# Patient Record
Sex: FEMALE | Race: OTHER | HISPANIC OR LATINO | Employment: UNEMPLOYED | URBAN - METROPOLITAN AREA
[De-identification: names, ages, dates, MRNs, and addresses within clinical notes are randomized per-mention and may not be internally consistent; named-entity substitution may affect disease eponyms.]

---

## 2020-04-22 ENCOUNTER — HOSPITAL ENCOUNTER (EMERGENCY)
Facility: HOSPITAL | Age: 20
Discharge: HOME | End: 2020-04-23
Attending: EMERGENCY MEDICINE
Payer: MEDICAID

## 2020-04-22 DIAGNOSIS — E87.6 HYPOKALEMIA: ICD-10-CM

## 2020-04-22 DIAGNOSIS — Z29.13 NEED FOR RHOGAM DUE TO RH NEGATIVE MOTHER: ICD-10-CM

## 2020-04-22 DIAGNOSIS — O20.0 THREATENED MISCARRIAGE IN EARLY PREGNANCY: Primary | ICD-10-CM

## 2020-04-22 LAB
ABO + RH BLD: NORMAL
ALBUMIN SERPL BCP-MCNC: 4.2 G/DL (ref 3.5–5.2)
ALP SERPL-CCNC: 64 U/L (ref 55–135)
ALT SERPL W/O P-5'-P-CCNC: 18 U/L (ref 10–44)
ANION GAP SERPL CALC-SCNC: 10 MMOL/L (ref 8–16)
AST SERPL-CCNC: 21 U/L (ref 10–40)
B-HCG UR QL: POSITIVE
BACTERIA #/AREA URNS HPF: ABNORMAL /HPF
BASOPHILS # BLD AUTO: 0.03 K/UL (ref 0–0.2)
BASOPHILS NFR BLD: 0.2 % (ref 0–1.9)
BILIRUB SERPL-MCNC: 0.2 MG/DL (ref 0.1–1)
BILIRUB UR QL STRIP: NEGATIVE
BUN SERPL-MCNC: 9 MG/DL (ref 6–20)
CALCIUM SERPL-MCNC: 10 MG/DL (ref 8.7–10.5)
CHLORIDE SERPL-SCNC: 102 MMOL/L (ref 95–110)
CLARITY UR: CLEAR
CO2 SERPL-SCNC: 27 MMOL/L (ref 23–29)
COLOR UR: YELLOW
CREAT SERPL-MCNC: 0.7 MG/DL (ref 0.5–1.4)
CTP QC/QA: YES
DIFFERENTIAL METHOD: ABNORMAL
EOSINOPHIL # BLD AUTO: 0.1 K/UL (ref 0–0.5)
EOSINOPHIL NFR BLD: 0.9 % (ref 0–8)
ERYTHROCYTE [DISTWIDTH] IN BLOOD BY AUTOMATED COUNT: 12.6 % (ref 11.5–14.5)
EST. GFR  (AFRICAN AMERICAN): >60 ML/MIN/1.73 M^2
EST. GFR  (NON AFRICAN AMERICAN): >60 ML/MIN/1.73 M^2
GLUCOSE SERPL-MCNC: 108 MG/DL (ref 70–110)
GLUCOSE UR QL STRIP: NEGATIVE
HCG INTACT+B SERPL-ACNC: 1123 MIU/ML
HCT VFR BLD AUTO: 38.2 % (ref 37–48.5)
HGB BLD-MCNC: 12.8 G/DL (ref 12–16)
HGB UR QL STRIP: ABNORMAL
IMM GRANULOCYTES # BLD AUTO: 0.05 K/UL (ref 0–0.04)
IMM GRANULOCYTES NFR BLD AUTO: 0.3 % (ref 0–0.5)
KETONES UR QL STRIP: NEGATIVE
LEUKOCYTE ESTERASE UR QL STRIP: NEGATIVE
LYMPHOCYTES # BLD AUTO: 2.7 K/UL (ref 1–4.8)
LYMPHOCYTES NFR BLD: 17.5 % (ref 18–48)
MCH RBC QN AUTO: 29.4 PG (ref 27–31)
MCHC RBC AUTO-ENTMCNC: 33.5 G/DL (ref 32–36)
MCV RBC AUTO: 88 FL (ref 82–98)
MICROSCOPIC COMMENT: ABNORMAL
MONOCYTES # BLD AUTO: 0.8 K/UL (ref 0.3–1)
MONOCYTES NFR BLD: 5.1 % (ref 4–15)
NEUTROPHILS # BLD AUTO: 11.6 K/UL (ref 1.8–7.7)
NEUTROPHILS NFR BLD: 76 % (ref 38–73)
NITRITE UR QL STRIP: NEGATIVE
NRBC BLD-RTO: 0 /100 WBC
PH UR STRIP: 8 [PH] (ref 5–8)
PLATELET # BLD AUTO: 305 K/UL (ref 150–350)
PMV BLD AUTO: 9.6 FL (ref 9.2–12.9)
POTASSIUM SERPL-SCNC: 2.9 MMOL/L (ref 3.5–5.1)
PROT SERPL-MCNC: 8.5 G/DL (ref 6–8.4)
PROT UR QL STRIP: NEGATIVE
RBC # BLD AUTO: 4.36 M/UL (ref 4–5.4)
RBC #/AREA URNS HPF: >100 /HPF (ref 0–4)
SODIUM SERPL-SCNC: 139 MMOL/L (ref 136–145)
SP GR UR STRIP: 1.01 (ref 1–1.03)
URN SPEC COLLECT METH UR: ABNORMAL
UROBILINOGEN UR STRIP-ACNC: NEGATIVE EU/DL
WBC # BLD AUTO: 15.29 K/UL (ref 3.9–12.7)

## 2020-04-22 PROCEDURE — 63700000 PHARM REV CODE 250 ALT 637 W/O HCPCS: Performed by: NURSE PRACTITIONER

## 2020-04-22 PROCEDURE — 85025 COMPLETE CBC W/AUTO DIFF WBC: CPT

## 2020-04-22 PROCEDURE — 96372 THER/PROPH/DIAG INJ SC/IM: CPT

## 2020-04-22 PROCEDURE — 80053 COMPREHEN METABOLIC PANEL: CPT

## 2020-04-22 PROCEDURE — 99284 EMERGENCY DEPT VISIT MOD MDM: CPT | Mod: 25

## 2020-04-22 PROCEDURE — 84702 CHORIONIC GONADOTROPIN TEST: CPT

## 2020-04-22 PROCEDURE — 86901 BLOOD TYPING SEROLOGIC RH(D): CPT

## 2020-04-22 PROCEDURE — 86850 RBC ANTIBODY SCREEN: CPT

## 2020-04-22 PROCEDURE — 81025 URINE PREGNANCY TEST: CPT | Performed by: NURSE PRACTITIONER

## 2020-04-22 PROCEDURE — 81000 URINALYSIS NONAUTO W/SCOPE: CPT

## 2020-04-22 RX ORDER — POTASSIUM CHLORIDE 20 MEQ/15ML
40 SOLUTION ORAL
Status: COMPLETED | OUTPATIENT
Start: 2020-04-22 | End: 2020-04-22

## 2020-04-22 RX ADMIN — POTASSIUM CHLORIDE 40 MEQ: 1.5 SOLUTION ORAL at 08:04

## 2020-04-23 VITALS
HEART RATE: 87 BPM | DIASTOLIC BLOOD PRESSURE: 77 MMHG | HEIGHT: 64 IN | TEMPERATURE: 99 F | RESPIRATION RATE: 18 BRPM | OXYGEN SATURATION: 99 % | SYSTOLIC BLOOD PRESSURE: 132 MMHG

## 2020-04-23 LAB
BLD GP AB SCN CELLS X3 SERPL QL: NORMAL
INJECT RH IG VOL PATIENT: NORMAL ML

## 2020-04-23 PROCEDURE — 63600519 RHOGAM PHARM REV CODE 636 ALT 250 W HCPCS: Performed by: NURSE PRACTITIONER

## 2020-04-23 RX ADMIN — HUMAN RHO(D) IMMUNE GLOBULIN 300 MCG: 300 INJECTION, SOLUTION INTRAMUSCULAR at 12:04

## 2020-04-23 NOTE — DISCHARGE INSTRUCTIONS
MUCHO aqua.     ¡[place] pélvico (nada en la vagina) hasta que veas a tu médico!    Regrese si está usando más de letty almohadilla por hora, si tiene empeoramiento o cambio de dolor, o si tiene alguna inquietud.  Usted necesitará tener laboratorios repetidos y ultrasonidos organizados por johnson médico dentro de los próximos 4-5 días..

## 2020-04-23 NOTE — ED PROVIDER NOTES
Encounter Date: 2020    SCRIBE #1 NOTE: I, Triston Burton, am scribing for, and in the presence of, RICHMOND Craig.       History     Chief Complaint   Patient presents with    Vaginal Bleeding     pt reports that she is 2 months pregnant, reports heavy vaginal bleeding that started today;     Abdominal Pain     lower abd pain and cramping with lower back pain     20 year-old  female presents to the emergency department with vaginal bleeding that began today. Patient reports she is currently 2 months pregnant. States he has used more than 5 pads today due to heavy vaginal bleeding and has passed clots. She denies any current abdominal pain. Her LMP was . She reports her menstrual cycles are relatively regular. She has not had an U/S for this pregnancy. Her first OB/GYN appointment for this pregnancy is scheduled for  with Dr. Palmer.  No trauma.  No other complaints at this time.      The history is provided by the patient. The history is limited by a language barrier. A  was used (JANETT Lew).     Review of patient's allergies indicates:  No Known Allergies  No past medical history on file.  No past surgical history on file.  No family history on file.  Social History     Tobacco Use    Smoking status: Not on file   Substance Use Topics    Alcohol use: Not on file    Drug use: Not on file     Review of Systems   Constitutional: Negative for fever.   HENT: Negative for sore throat.    Respiratory: Negative for shortness of breath.    Cardiovascular: Negative for chest pain.   Gastrointestinal: Negative for abdominal pain and vomiting.   Genitourinary: Positive for vaginal bleeding. Negative for dysuria.   Musculoskeletal: Negative for back pain.   Skin: Negative for rash.   Neurological: Negative for weakness.   Hematological: Does not bruise/bleed easily.   All other systems reviewed and are negative.      Physical Exam     Initial Vitals [20 1909]   BP  Pulse Resp Temp SpO2   137/76 107 20 98.9 °F (37.2 °C) 100 %      MAP       --         Physical Exam    Nursing note and vitals reviewed.  Constitutional: Vital signs are normal. She appears well-developed and well-nourished. She is active and cooperative. She is easily aroused.  Non-toxic appearance. She does not have a sickly appearance. She does not appear ill. No distress.   HENT:   Head: Normocephalic and atraumatic.   Eyes: EOM are normal. Pupils are equal, round, and reactive to light.   Neck: Normal range of motion. Neck supple.   Cardiovascular: Normal rate, regular rhythm, normal heart sounds and intact distal pulses.   Pulmonary/Chest: Effort normal and breath sounds normal. No respiratory distress. She has no wheezes.   Abdominal: Soft. Normal appearance and bowel sounds are normal. She exhibits no distension. There is no tenderness. There is no rigidity, no rebound and no guarding.   Genitourinary: Pelvic exam was performed with patient supine. No labial fusion. There is no rash, tenderness, lesion or injury on the right labia. There is no rash, tenderness, lesion or injury on the left labia.   Genitourinary Comments:  exam witnessed by Nurse gosia Izaguirre.    No adnexal tenderness or fullness.     Os open.   Moderate amount dark red blood with clots.  No visualized tissue.     Musculoskeletal: Normal range of motion. She exhibits no edema.   Neurological: She is alert, oriented to person, place, and time and easily aroused.   Skin: Skin is warm and dry.         ED Course   Procedures  Labs Reviewed   CBC W/ AUTO DIFFERENTIAL - Abnormal; Notable for the following components:       Result Value    WBC 15.29 (*)     Gran # (ANC) 11.6 (*)     Immature Grans (Abs) 0.05 (*)     Gran% 76.0 (*)     Lymph% 17.5 (*)     All other components within normal limits   COMPREHENSIVE METABOLIC PANEL - Abnormal; Notable for the following components:    Potassium 2.9 (*)     Total Protein 8.5 (*)     All other components  within normal limits   URINALYSIS, REFLEX TO URINE CULTURE - Abnormal; Notable for the following components:    Occult Blood UA 3+ (*)     All other components within normal limits    Narrative:     Preferred Collection Type->Urine, Clean Catch   URINALYSIS MICROSCOPIC - Abnormal; Notable for the following components:    RBC, UA >100 (*)     All other components within normal limits    Narrative:     Preferred Collection Type->Urine, Clean Catch   POCT URINE PREGNANCY - Abnormal; Notable for the following components:    POC Preg Test, Ur Positive (*)     All other components within normal limits   HCG, QUANTITATIVE, PREGNANCY   GROUP & RH   POST PARTUM TYPE AND SCREEN   HALEY - [facility] (FETAL BLEED SCREEN)          Imaging Results          US OB <14 Wks TransAbd & TransVag, Single Gestation (XPD) (Final result)  Result time 20 21:16:30    Final result by Amandeep Velázquez MD (20 21:16:30)                 Impression:      No intrauterine pregnancy or gestational sac visualized, technically pregnancy of unknown location.  Findings may relate to early pregnancy or spontaneous .  No adnexal abnormalities or significant free fluid seen to suggest ectopic pregnancy.  Recommend serial beta hCGs and repeat pelvic ultrasound as clinically warranted.      Electronically signed by: Amandeep Velázquez MD  Date:    2020  Time:    21:16             Narrative:    EXAMINATION:  US OB <14 WEEKS, TRANSABDOM & TRANSVAG, SINGLE GESTATION (XPD)    CLINICAL HISTORY:  vaginal bleeding;    TECHNIQUE:  Transabdominal sonography of the pelvis was performed, followed by transvaginal sonography to better evaluate the uterus and ovaries.    COMPARISON:  None.    FINDINGS:  The uterus is retroverted and measures 8.7 x 5.3 x 6.1 cm.  Uterine parenchyma is heterogenous in echotexture.  No intrauterine pregnancy or gestational sac seen.  There is mild thickened heterogenous appearance of the endometrium.    The right ovary  measures 3.0 x 1.9 x 1.8 cm. The left ovary measures 3.6 x 2.5 x 2.1 cm. Arterial and venous flow are preserved bilaterally. A suspected corpus luteum cyst measuring 1.5 x 1.2 x 1.3 cm is seen in the left ovary. Trace physiologic free fluid is seen.  No adnexal abnormalities are seen.                                 Medical Decision Making:   Differential Diagnosis:   Ectopic pregnancy, placental abruption, placenta previa, ruptured ovarian cyst, ovarian torsion, infection, foreign body, coagulation disorder, neoplasm, menstruation, drug effect.     Clinical Tests:   Lab Tests: Ordered and Reviewed  ED Management:  Labs, US, Pelvic exam.     Pt is O negative and Rhogam was given.  K low and oral replacement was given.  She is tolerating oral fluids.  H&H stable.  Hcg 1123. Vitals reassuring.   US does not visualize a pregnancy.  I fear this patient may have had a miscarriage, but she will need repeat labs and US. I do not suspect ectopic as she has no adnexal tenderness or fullness on exam. Pt does have a f/u appointment with her OB/GYN on 4/27.  I advised her to call her OB/GYN tomorrow to notify them of today's ED visit.  She verbalized understanding, compliance, and agreement with the treatment plan.  I did review need for pelvic rest as well as strict return precautions.    Other:   I have discussed this case with another health care provider.       <> Summary of the Discussion: Discussed with  who agrees with ED course and disposition.                                   Clinical Impression:       ICD-10-CM ICD-9-CM   1. Threatened miscarriage in early pregnancy O20.0 640.03   2. Hypokalemia E87.6 276.8   3. Need for rhogam due to Rh negative mother Z29.13 V07.2           Disposition:   Disposition: Discharged  Condition: Stable         I, Hafsa FRAGOSO, personally performed the services described in this documentation. All medical record entries made by the scribe were at my direction and in  my presence.  I have reviewed the chart and agree that the record reflects my personal performance and is accurate and complete.     LEROY Craig  10:24 PM 04/22/2020                 RICHMOND Pierre  04/22/20 [id number]

## 2020-04-27 ENCOUNTER — LAB VISIT (OUTPATIENT)
Dept: LAB | Facility: HOSPITAL | Age: 20
End: 2020-04-27
Attending: OBSTETRICS & GYNECOLOGY
Payer: MEDICAID

## 2020-04-27 ENCOUNTER — OFFICE VISIT (OUTPATIENT)
Dept: OBSTETRICS AND GYNECOLOGY | Facility: CLINIC | Age: 20
End: 2020-04-27
Payer: MEDICAID

## 2020-04-27 VITALS
BODY MASS INDEX: 29.63 KG/M2 | SYSTOLIC BLOOD PRESSURE: 118 MMHG | DIASTOLIC BLOOD PRESSURE: 70 MMHG | WEIGHT: 172.63 LBS

## 2020-04-27 DIAGNOSIS — O03.9 SAB (SPONTANEOUS ABORTION): ICD-10-CM

## 2020-04-27 DIAGNOSIS — N91.2 AMENORRHEA: Primary | ICD-10-CM

## 2020-04-27 DIAGNOSIS — N91.2 AMENORRHEA: ICD-10-CM

## 2020-04-27 LAB — HCG INTACT+B SERPL-ACNC: 57 MIU/ML

## 2020-04-27 PROCEDURE — 36415 COLL VENOUS BLD VENIPUNCTURE: CPT

## 2020-04-27 PROCEDURE — 99999 PR PBB SHADOW E&M-EST. PATIENT-LVL II: CPT | Mod: PBBFAC,,, | Performed by: OBSTETRICS & GYNECOLOGY

## 2020-04-27 PROCEDURE — 99212 OFFICE O/P EST SF 10 MIN: CPT | Mod: PBBFAC,TH,PO | Performed by: OBSTETRICS & GYNECOLOGY

## 2020-04-27 PROCEDURE — 99203 OFFICE O/P NEW LOW 30 MIN: CPT | Mod: S$PBB,,, | Performed by: OBSTETRICS & GYNECOLOGY

## 2020-04-27 PROCEDURE — 87491 CHLMYD TRACH DNA AMP PROBE: CPT

## 2020-04-27 PROCEDURE — 87086 URINE CULTURE/COLONY COUNT: CPT

## 2020-04-27 PROCEDURE — 84702 CHORIONIC GONADOTROPIN TEST: CPT

## 2020-04-27 PROCEDURE — 99203 PR OFFICE/OUTPT VISIT, NEW, LEVL III, 30-44 MIN: ICD-10-PCS | Mod: S$PBB,,, | Performed by: OBSTETRICS & GYNECOLOGY

## 2020-04-27 PROCEDURE — 88175 CYTOPATH C/V AUTO FLUID REDO: CPT

## 2020-04-27 PROCEDURE — 99999 PR PBB SHADOW E&M-EST. PATIENT-LVL II: ICD-10-PCS | Mod: PBBFAC,,, | Performed by: OBSTETRICS & GYNECOLOGY

## 2020-04-27 NOTE — PROGRESS NOTES
19 yo  female who presents to confirm her pregnancy.  Patient's last menstrual period was 2020.  Patient reports that she presented to the ED for evaluation on 2020 for heavy vaginal bleeding and abdominal pain.  Reports to her visit, she reports having vaginal spotting x 2 days that eventually ended with heavy bleeding like a cycle.  Patient presented to the ED for evaluation on 2020.  Pelvic US was completed for dating. No IUP noted.  Serum bhcg noted to be 1123.  Patient reports that her bleeding stopped soon after leaving the ED.    PMH: none  Surgery: none  Obhx:  g1: , x 1, term  G2: SAB, complete      ALL: NKDA  Social: denies t/d/e  Gynhx: no h/o STDs; never had pap smear    ROS:  GENERAL: Denies weight gain or weight loss. Feeling well overall.   SKIN: Denies rash or lesions.   HEAD: Denies head injury or headache.   NODES: Denies enlarged lymph nodes.   CHEST: Denies chest pain or shortness of breath.   CARDIOVASCULAR: Denies palpitations or left sided chest pain.   ABDOMEN: No abdominal pain, constipation, diarrhea, nausea, vomiting or rectal bleeding.   URINARY: No frequency, dysuria, hematuria, or burning on urination.  REPRODUCTIVE: See HPI. Min bleeding today  BREASTS: denies pain, lumps, or nipple discharge.   HEMATOLOGIC: No easy bruisability or excessive bleeding.   MUSCULOSKELETAL: Denies joint pain or swelling.   NEUROLOGIC: Denies syncope or weakness.   PSYCHIATRIC: Denies depression, anxiety or mood swings.         PE:   Vitals: /70   Wt 78.3 kg (172 lb 9.9 oz)   LMP 2020   BMI 29.63 kg/m²   APPEARANCE: Well nourished, well developed, in no acute distress.  ABDOMEN: Soft. No tenderness or masses. No hepatosplenomegaly. No hernias.  BREASTS: Symmetrical, no skin changes or visible lesions. No palpable masses, nipple discharge or adenopathy bilaterally.  PELVIC: Normal external female genitalia without lesions. Normal hair distribution. Adequate perineal  body, normal urethral meatus. Vagina moist and well rugated without lesions or discharge. Cervix pink and without lesions. No significant cystocele or rectocele. Bimanual exam deferred    AP:   1) Amenorrhea, likely s/p complete SAB in the first trimester  -office UPT faintly positive  -serum bhcg ordered  -pap collected  -gc/chl and urine cx ordered  -Rh negative: s/p rhogam on 4/22/2020 in the ED    2) Contraception  Declines for now    mian Palmer MD

## 2020-04-28 ENCOUNTER — TELEPHONE (OUTPATIENT)
Dept: OBSTETRICS AND GYNECOLOGY | Facility: CLINIC | Age: 20
End: 2020-04-28

## 2020-04-28 NOTE — TELEPHONE ENCOUNTER
----- Message from Marci Costa MA sent at 4/28/2020  8:25 AM CDT -----      ----- Message -----  From: Gamaliel Palmer MD  Sent: 4/28/2020   8:16 AM CDT  To: Spencer DONOVAN Staff    Inform the patient that blood level is 57 - down from 1123 six days ago.  This is consistent with a miscarriage - which we discussed at her visit.  Her home pregnancy test should be negative in the next week.    Please call if she has any concerns.    Dr palmer

## 2020-04-29 LAB
BACTERIA UR CULT: NO GROWTH
FINAL PATHOLOGIC DIAGNOSIS: NORMAL
Lab: NORMAL

## 2020-04-30 LAB
C TRACH DNA SPEC QL NAA+PROBE: DETECTED
N GONORRHOEA DNA SPEC QL NAA+PROBE: NOT DETECTED

## 2020-05-01 ENCOUNTER — TELEPHONE (OUTPATIENT)
Dept: OBSTETRICS AND GYNECOLOGY | Facility: CLINIC | Age: 20
End: 2020-05-01

## 2020-05-01 DIAGNOSIS — N72 CERVICITIS: Primary | ICD-10-CM

## 2020-05-01 RX ORDER — AZITHROMYCIN 500 MG/1
TABLET, FILM COATED ORAL
Qty: 4 TABLET | Refills: 1 | Status: ON HOLD | OUTPATIENT
Start: 2020-05-01 | End: 2023-07-06

## 2023-01-26 ENCOUNTER — OFFICE VISIT (OUTPATIENT)
Dept: OBSTETRICS AND GYNECOLOGY | Facility: CLINIC | Age: 23
End: 2023-01-26
Payer: MEDICAID

## 2023-01-26 VITALS
WEIGHT: 190.25 LBS | BODY MASS INDEX: 32.66 KG/M2 | SYSTOLIC BLOOD PRESSURE: 120 MMHG | DIASTOLIC BLOOD PRESSURE: 77 MMHG

## 2023-01-26 DIAGNOSIS — Z32.01 POSITIVE PREGNANCY TEST: Primary | ICD-10-CM

## 2023-01-26 PROCEDURE — 99202 PR OFFICE/OUTPT VISIT, NEW, LEVL II, 15-29 MIN: ICD-10-PCS | Mod: TH,S$PBB,, | Performed by: STUDENT IN AN ORGANIZED HEALTH CARE EDUCATION/TRAINING PROGRAM

## 2023-01-26 PROCEDURE — 99999 PR PBB SHADOW E&M-EST. PATIENT-LVL II: ICD-10-PCS | Mod: PBBFAC,,, | Performed by: STUDENT IN AN ORGANIZED HEALTH CARE EDUCATION/TRAINING PROGRAM

## 2023-01-26 PROCEDURE — 1159F MED LIST DOCD IN RCRD: CPT | Mod: CPTII,,, | Performed by: STUDENT IN AN ORGANIZED HEALTH CARE EDUCATION/TRAINING PROGRAM

## 2023-01-26 PROCEDURE — 81514 NFCT DS BV&VAGINITIS DNA ALG: CPT | Performed by: STUDENT IN AN ORGANIZED HEALTH CARE EDUCATION/TRAINING PROGRAM

## 2023-01-26 PROCEDURE — 87591 N.GONORRHOEAE DNA AMP PROB: CPT | Performed by: STUDENT IN AN ORGANIZED HEALTH CARE EDUCATION/TRAINING PROGRAM

## 2023-01-26 PROCEDURE — 3008F BODY MASS INDEX DOCD: CPT | Mod: CPTII,,, | Performed by: STUDENT IN AN ORGANIZED HEALTH CARE EDUCATION/TRAINING PROGRAM

## 2023-01-26 PROCEDURE — 3008F PR BODY MASS INDEX (BMI) DOCUMENTED: ICD-10-PCS | Mod: CPTII,,, | Performed by: STUDENT IN AN ORGANIZED HEALTH CARE EDUCATION/TRAINING PROGRAM

## 2023-01-26 PROCEDURE — 99212 OFFICE O/P EST SF 10 MIN: CPT | Mod: PBBFAC,TH,PO | Performed by: STUDENT IN AN ORGANIZED HEALTH CARE EDUCATION/TRAINING PROGRAM

## 2023-01-26 PROCEDURE — 87086 URINE CULTURE/COLONY COUNT: CPT | Performed by: STUDENT IN AN ORGANIZED HEALTH CARE EDUCATION/TRAINING PROGRAM

## 2023-01-26 PROCEDURE — 3078F DIAST BP <80 MM HG: CPT | Mod: CPTII,,, | Performed by: STUDENT IN AN ORGANIZED HEALTH CARE EDUCATION/TRAINING PROGRAM

## 2023-01-26 PROCEDURE — 88175 CYTOPATH C/V AUTO FLUID REDO: CPT | Performed by: STUDENT IN AN ORGANIZED HEALTH CARE EDUCATION/TRAINING PROGRAM

## 2023-01-26 PROCEDURE — 3074F SYST BP LT 130 MM HG: CPT | Mod: CPTII,,, | Performed by: STUDENT IN AN ORGANIZED HEALTH CARE EDUCATION/TRAINING PROGRAM

## 2023-01-26 PROCEDURE — 99999 PR PBB SHADOW E&M-EST. PATIENT-LVL II: CPT | Mod: PBBFAC,,, | Performed by: STUDENT IN AN ORGANIZED HEALTH CARE EDUCATION/TRAINING PROGRAM

## 2023-01-26 PROCEDURE — 99202 OFFICE O/P NEW SF 15 MIN: CPT | Mod: TH,S$PBB,, | Performed by: STUDENT IN AN ORGANIZED HEALTH CARE EDUCATION/TRAINING PROGRAM

## 2023-01-26 PROCEDURE — 3078F PR MOST RECENT DIASTOLIC BLOOD PRESSURE < 80 MM HG: ICD-10-PCS | Mod: CPTII,,, | Performed by: STUDENT IN AN ORGANIZED HEALTH CARE EDUCATION/TRAINING PROGRAM

## 2023-01-26 PROCEDURE — 1159F PR MEDICATION LIST DOCUMENTED IN MEDICAL RECORD: ICD-10-PCS | Mod: CPTII,,, | Performed by: STUDENT IN AN ORGANIZED HEALTH CARE EDUCATION/TRAINING PROGRAM

## 2023-01-26 PROCEDURE — 3074F PR MOST RECENT SYSTOLIC BLOOD PRESSURE < 130 MM HG: ICD-10-PCS | Mod: CPTII,,, | Performed by: STUDENT IN AN ORGANIZED HEALTH CARE EDUCATION/TRAINING PROGRAM

## 2023-01-26 NOTE — PROGRESS NOTES
Reason for Visit   New Pregnancy (UPT +)    HPI   22 y.o. female  at ?14w3d by Patient's last menstrual period was 10/17/2022 (exact date). presents for initial OB appointment. Patient feels well this pregnancy, reports no major issues/concerns.     Nausea:  No  Vomiting: No  Cramping: No  Bleeding:  No    Family history of bleeding disorders, birth defects, or mental disability: DENIES  Complications with prior pregnancy: DENIES    Pap: 2020, NILM  Allergies: Patient has no known allergies.  OB History    Para Term  AB Living   2 1 1   1 1   SAB IAB Ectopic Multiple Live Births   1       1      # Outcome Date GA Lbr Ben/2nd Weight Sex Delivery Anes PTL Lv   2 SAB  13w0d          1 Term  40w0d   M Vag-Spont   CONOR     History reviewed. No pertinent past medical history.   History reviewed. No pertinent surgical history.     ROS:  GENERAL: Denies weight gain or weight loss. Feeling well overall.   SKIN: Denies rash or lesions.   HEAD: Denies head injury or headache.   NODES: Denies enlarged lymph nodes.   CHEST: Denies chest pain or shortness of breath.   CARDIOVASCULAR: Denies palpitations or left sided chest pain.   ABDOMEN: No abdominal pain, constipation, diarrhea, nausea, vomiting or rectal bleeding.   URINARY: No frequency, dysuria, hematuria, or burning on urination.  REPRODUCTIVE: See HPI.   BREASTS: The patient performs breast self-examination and denies pain, lumps, or nipple discharge.   HEMATOLOGIC: No easy bruisability or excessive bleeding.  MUSCULOSKELETAL: Denies joint pain or swelling.   NEUROLOGIC: Denies syncope or weakness.   PSYCHIATRIC: Denies depression, anxiety or mood swings.      Exam   /77   Wt 86.3 kg (190 lb 4.1 oz)   LMP 10/17/2022 (Exact Date)   BMI 32.66 kg/m²     Physical Exam:  APPEARANCE: Well nourished, well developed, in no acute distress.  AFFECT: WNL, alert and oriented x 3  SKIN: No acne or hirsutism  NECK: Neck symmetric without  masses or thyromegaly  NODES: No inguinal, cervical, axillary, or femoral lymph node enlargement  CHEST: Good respiratory effect  ABDOMEN: Soft.  No tenderness or masses.  No hepatosplenomegaly.  No hernias.  BREASTS: Symmetrical, no skin changes or visible lesions.  No palpable masses, nipple discharge bilaterally.  PELVIC: Normal external genitalia without lesions.  Normal hair distribution.  Adequate perineal body, normal urethral meatus.  Vagina moist and well rugated without lesions or discharge.  Cervix pink, without lesions, discharge or tenderness.  No significant cystocele or rectocele.   EXTREMITIES: No edema.    Assessment and Plan   Positive pregnancy test  -     Hepatitis C Antibody; Future; Expected date: 01/26/2023  -     HIV 1/2 Ag/Ab (4th Gen); Future; Expected date: 01/26/2023  -     RPR; Future; Expected date: 01/26/2023  -     Hepatitis B surface antigen; Future; Expected date: 01/26/2023  -     Type & Screen; Future; Expected date: 01/26/2023  -     Rubella antibody, IgG; Future; Expected date: 01/26/2023  -     Urine culture  -     CBC auto differential; Future; Expected date: 01/26/2023  -     US OB/GYN Procedure ([facility]); Future  -     C. trachomatis/N. gonorrhoeae by AMP DNA [facility]; Vagina  -     Liquid-Based Pap Smear, Screening  -     Hemoglobin Electrophoresis,Hgb A2 Jean Marie.; Future; Expected date: 01/26/2023  -     Cystic Fibrosis Mutation Panel; Future; Expected date: 01/26/2023  -     Vaginosis Screen by DNA Probe        - LMP 10/17/2022  (14w3d by LMP)  - Dating US ordered  - Initial prenatal labs ordered   - Hgb electrophoresis, Carrier screening: ordered  - Aneuploidy screening: pending pregnancy dating   - PNV: taking   - ASA: low risk     Patient was counseled today on:  - Routine prenatal blood tests including HIV and anticipated course of prenatal care  - Prenatal vitamins and folic acid  - Foods to avoid in pregnancy (i.e. sushi, fish that are high in mercury, deli meat, and  unpasteurized cheeses).   - Avoiding cat litter and raw meats due to risk of Toxoplasmosis   - Aneuploidy and neural tube screening: cfDNA vs integrated screening, AFP screen at 15 weeks  - Hgb electrophoresis and Carrier screening (CF, SMA) offered, fragile X as indicated by patient history   - MFM u/s for anatomy at 18-20 weeks.  - All questions were answered          Return to clinic in 4 weeks          Stephanie Heaney, MD OBGYN [facility]

## 2023-01-27 ENCOUNTER — LAB VISIT (OUTPATIENT)
Dept: LAB | Facility: HOSPITAL | Age: 23
End: 2023-01-27
Attending: STUDENT IN AN ORGANIZED HEALTH CARE EDUCATION/TRAINING PROGRAM
Payer: MEDICAID

## 2023-01-27 ENCOUNTER — PROCEDURE VISIT (OUTPATIENT)
Dept: OBSTETRICS AND GYNECOLOGY | Facility: CLINIC | Age: 23
End: 2023-01-27
Payer: MEDICAID

## 2023-01-27 ENCOUNTER — TELEPHONE (OUTPATIENT)
Dept: OBSTETRICS AND GYNECOLOGY | Facility: CLINIC | Age: 23
End: 2023-01-27

## 2023-01-27 DIAGNOSIS — Z3A.17 17 WEEKS GESTATION OF PREGNANCY: Primary | ICD-10-CM

## 2023-01-27 DIAGNOSIS — Z32.01 POSITIVE PREGNANCY TEST: ICD-10-CM

## 2023-01-27 LAB
ABO + RH BLD: NORMAL
BASOPHILS # BLD AUTO: 0.03 K/UL (ref 0–0.2)
BASOPHILS NFR BLD: 0.3 % (ref 0–1.9)
BLD GP AB SCN CELLS X3 SERPL QL: NORMAL
DIFFERENTIAL METHOD: ABNORMAL
EOSINOPHIL # BLD AUTO: 0.1 K/UL (ref 0–0.5)
EOSINOPHIL NFR BLD: 0.5 % (ref 0–8)
ERYTHROCYTE [DISTWIDTH] IN BLOOD BY AUTOMATED COUNT: 12.7 % (ref 11.5–14.5)
HBV SURFACE AG SERPL QL IA: NORMAL
HCT VFR BLD AUTO: 36.1 % (ref 37–48.5)
HCV AB SERPL QL IA: NORMAL
HGB BLD-MCNC: 12 G/DL (ref 12–16)
HIV 1+2 AB+HIV1 P24 AG SERPL QL IA: NORMAL
IMM GRANULOCYTES # BLD AUTO: 0.03 K/UL (ref 0–0.04)
IMM GRANULOCYTES NFR BLD AUTO: 0.3 % (ref 0–0.5)
LYMPHOCYTES # BLD AUTO: 2.9 K/UL (ref 1–4.8)
LYMPHOCYTES NFR BLD: 23.9 % (ref 18–48)
MCH RBC QN AUTO: 29.2 PG (ref 27–31)
MCHC RBC AUTO-ENTMCNC: 33.2 G/DL (ref 32–36)
MCV RBC AUTO: 88 FL (ref 82–98)
MONOCYTES # BLD AUTO: 0.7 K/UL (ref 0.3–1)
MONOCYTES NFR BLD: 5.7 % (ref 4–15)
NEUTROPHILS # BLD AUTO: 8.3 K/UL (ref 1.8–7.7)
NEUTROPHILS NFR BLD: 69.3 % (ref 38–73)
NRBC BLD-RTO: 0 /100 WBC
PLATELET # BLD AUTO: 273 K/UL (ref 150–450)
PMV BLD AUTO: 9.9 FL (ref 9.2–12.9)
RBC # BLD AUTO: 4.11 M/UL (ref 4–5.4)
RH BLD: NORMAL
WBC # BLD AUTO: 11.94 K/UL (ref 3.9–12.7)

## 2023-01-27 PROCEDURE — 36415 COLL VENOUS BLD VENIPUNCTURE: CPT | Performed by: STUDENT IN AN ORGANIZED HEALTH CARE EDUCATION/TRAINING PROGRAM

## 2023-01-27 PROCEDURE — 83020 HEMOGLOBIN ELECTROPHORESIS: CPT | Performed by: STUDENT IN AN ORGANIZED HEALTH CARE EDUCATION/TRAINING PROGRAM

## 2023-01-27 PROCEDURE — 76805 US OB/GYN PROCEDURE (VIEWPOINT): ICD-10-PCS | Mod: 26,S$PBB,, | Performed by: OBSTETRICS & GYNECOLOGY

## 2023-01-27 PROCEDURE — 76805 OB US >/= 14 WKS SNGL FETUS: CPT | Mod: PBBFAC,PO | Performed by: OBSTETRICS & GYNECOLOGY

## 2023-01-27 PROCEDURE — 86900 BLOOD TYPING SEROLOGIC ABO: CPT | Performed by: STUDENT IN AN ORGANIZED HEALTH CARE EDUCATION/TRAINING PROGRAM

## 2023-01-27 PROCEDURE — 86592 SYPHILIS TEST NON-TREP QUAL: CPT | Performed by: STUDENT IN AN ORGANIZED HEALTH CARE EDUCATION/TRAINING PROGRAM

## 2023-01-27 PROCEDURE — 87389 HIV-1 AG W/HIV-1&-2 AB AG IA: CPT | Performed by: STUDENT IN AN ORGANIZED HEALTH CARE EDUCATION/TRAINING PROGRAM

## 2023-01-27 PROCEDURE — 86901 BLOOD TYPING SEROLOGIC RH(D): CPT | Mod: XB | Performed by: STUDENT IN AN ORGANIZED HEALTH CARE EDUCATION/TRAINING PROGRAM

## 2023-01-27 PROCEDURE — 86762 RUBELLA ANTIBODY: CPT | Performed by: STUDENT IN AN ORGANIZED HEALTH CARE EDUCATION/TRAINING PROGRAM

## 2023-01-27 PROCEDURE — 86803 HEPATITIS C AB TEST: CPT | Performed by: STUDENT IN AN ORGANIZED HEALTH CARE EDUCATION/TRAINING PROGRAM

## 2023-01-27 PROCEDURE — 85025 COMPLETE CBC W/AUTO DIFF WBC: CPT | Performed by: STUDENT IN AN ORGANIZED HEALTH CARE EDUCATION/TRAINING PROGRAM

## 2023-01-27 PROCEDURE — 87340 HEPATITIS B SURFACE AG IA: CPT | Performed by: STUDENT IN AN ORGANIZED HEALTH CARE EDUCATION/TRAINING PROGRAM

## 2023-01-27 PROCEDURE — 81220 CFTR GENE COM VARIANTS: CPT | Performed by: STUDENT IN AN ORGANIZED HEALTH CARE EDUCATION/TRAINING PROGRAM

## 2023-01-27 NOTE — TELEPHONE ENCOUNTER
----- Message from Arabella Potts MD sent at 1/27/2023 12:11 PM CST -----  Regarding: anatomy scan scheduling  Good afternoon!    I saw this patient yesterday for first prenatal visit, her ultrasound today for dating shows she is 17 weeks along and will need an anatomy scan scheduled in 3 weeks before she comes back for her next prenatal visit. Would you be able to get her on the schedule for an anatomy scan?    Thank you!  Arabella

## 2023-01-28 LAB
BACTERIA UR CULT: NO GROWTH
RPR SER QL: NORMAL

## 2023-01-29 LAB
C TRACH DNA SPEC QL NAA+PROBE: NOT DETECTED
N GONORRHOEA DNA SPEC QL NAA+PROBE: NOT DETECTED

## 2023-01-30 LAB
HGB A2 MFR BLD HPLC: 2.8 % (ref 2.2–3.2)
HGB FRACT BLD ELPH-IMP: NORMAL
HGB FRACT BLD ELPH-IMP: NORMAL
RUBV IGG SER-ACNC: <5 IU/ML
RUBV IGG SER-IMP: ABNORMAL

## 2023-01-31 LAB
BACTERIAL VAGINOSIS DNA: POSITIVE
CANDIDA GLABRATA DNA: NEGATIVE
CANDIDA KRUSEI DNA: NEGATIVE
CANDIDA RRNA VAG QL PROBE: NEGATIVE
T VAGINALIS RRNA GENITAL QL PROBE: NEGATIVE

## 2023-02-01 ENCOUNTER — TELEPHONE (OUTPATIENT)
Dept: OBSTETRICS AND GYNECOLOGY | Facility: CLINIC | Age: 23
End: 2023-02-01
Payer: MEDICAID

## 2023-02-01 DIAGNOSIS — B96.89 BV (BACTERIAL VAGINOSIS): Primary | ICD-10-CM

## 2023-02-01 DIAGNOSIS — N76.0 BV (BACTERIAL VAGINOSIS): Primary | ICD-10-CM

## 2023-02-01 RX ORDER — METRONIDAZOLE 500 MG/1
500 TABLET ORAL EVERY 12 HOURS
Qty: 14 TABLET | Refills: 0 | Status: SHIPPED | OUTPATIENT
Start: 2023-02-01 | End: 2023-02-08

## 2023-02-03 LAB
CFTR MUT ANL BLD/T: NORMAL
FINAL PATHOLOGIC DIAGNOSIS: NORMAL
Lab: NORMAL

## 2023-02-17 ENCOUNTER — PROCEDURE VISIT (OUTPATIENT)
Dept: MATERNAL FETAL MEDICINE | Facility: CLINIC | Age: 23
End: 2023-02-17
Payer: MEDICAID

## 2023-02-17 DIAGNOSIS — Z3A.17 17 WEEKS GESTATION OF PREGNANCY: ICD-10-CM

## 2023-02-17 PROCEDURE — 76811 OB US DETAILED SNGL FETUS: CPT | Mod: PBBFAC,PO | Performed by: OBSTETRICS & GYNECOLOGY

## 2023-02-17 PROCEDURE — 76811 US OB/GYN PROCEDURE (VIEWPOINT): ICD-10-PCS | Mod: 26,S$PBB,, | Performed by: OBSTETRICS & GYNECOLOGY

## 2023-02-23 ENCOUNTER — LAB VISIT (OUTPATIENT)
Dept: LAB | Facility: HOSPITAL | Age: 23
End: 2023-02-23
Attending: STUDENT IN AN ORGANIZED HEALTH CARE EDUCATION/TRAINING PROGRAM
Payer: MEDICAID

## 2023-02-23 ENCOUNTER — ROUTINE PRENATAL (OUTPATIENT)
Dept: OBSTETRICS AND GYNECOLOGY | Facility: CLINIC | Age: 23
End: 2023-02-23
Payer: MEDICAID

## 2023-02-23 VITALS
WEIGHT: 194.44 LBS | BODY MASS INDEX: 33.38 KG/M2 | DIASTOLIC BLOOD PRESSURE: 78 MMHG | SYSTOLIC BLOOD PRESSURE: 115 MMHG

## 2023-02-23 DIAGNOSIS — Z3A.21 21 WEEKS GESTATION OF PREGNANCY: Primary | ICD-10-CM

## 2023-02-23 DIAGNOSIS — Z3A.21 21 WEEKS GESTATION OF PREGNANCY: ICD-10-CM

## 2023-02-23 PROCEDURE — 99999 PR PBB SHADOW E&M-EST. PATIENT-LVL II: CPT | Mod: PBBFAC,,, | Performed by: STUDENT IN AN ORGANIZED HEALTH CARE EDUCATION/TRAINING PROGRAM

## 2023-02-23 PROCEDURE — 99212 OFFICE O/P EST SF 10 MIN: CPT | Mod: PBBFAC,PO | Performed by: STUDENT IN AN ORGANIZED HEALTH CARE EDUCATION/TRAINING PROGRAM

## 2023-02-23 PROCEDURE — 99212 PR OFFICE/OUTPT VISIT, EST, LEVL II, 10-19 MIN: ICD-10-PCS | Mod: TH,S$PBB,, | Performed by: STUDENT IN AN ORGANIZED HEALTH CARE EDUCATION/TRAINING PROGRAM

## 2023-02-23 PROCEDURE — 36415 COLL VENOUS BLD VENIPUNCTURE: CPT | Performed by: STUDENT IN AN ORGANIZED HEALTH CARE EDUCATION/TRAINING PROGRAM

## 2023-02-23 PROCEDURE — 81511 FTL CGEN ABNOR FOUR ANAL: CPT | Performed by: STUDENT IN AN ORGANIZED HEALTH CARE EDUCATION/TRAINING PROGRAM

## 2023-02-23 PROCEDURE — 99999 PR PBB SHADOW E&M-EST. PATIENT-LVL II: ICD-10-PCS | Mod: PBBFAC,,, | Performed by: STUDENT IN AN ORGANIZED HEALTH CARE EDUCATION/TRAINING PROGRAM

## 2023-02-23 PROCEDURE — 99212 OFFICE O/P EST SF 10 MIN: CPT | Mod: TH,S$PBB,, | Performed by: STUDENT IN AN ORGANIZED HEALTH CARE EDUCATION/TRAINING PROGRAM

## 2023-02-23 NOTE — PROGRESS NOTES
Reason for Visit   Routine Prenatal Visit   #[id number]  HPI   22 y.o., at 21w0d by Estimated Date of Delivery: 7/6/23    Patient feels well today, no complaints    Contractions: No   Bleeding: No   Loss of fluid: No   Fetal movement: Yes   Nausea: No   Vomiting: No   Headache: No     Pregnancy dating, labs, ultrasound reports, prenatal testing, and problem list; prior records and results; and available outside records were reviewed and updated in EMR.        Current Outpatient Medications:     prenatal 168/iron/folic/omega3 (ONE-A-DAY PRENATAL-1 ORAL), Take by mouth., Disp: , Rfl:     azithromycin (ZITHROMAX) 500 MG tablet, Take 2 tablets by mouth orally after your next meal. If you throw up, repeat the dose., Disp: 4 tablet, Rfl: 1   Exam   /78   Wt 88.2 kg (194 lb 7.1 oz)   LMP 10/17/2022 (Exact Date)   BMI 33.38 kg/m²     GENERAL: No acute distress  ABD: Gravid  Fundal height: 20  FHR: 155  SVE: n/a    Assessment and Plan   21 weeks gestation of pregnancy  -     Maternal Quad Screen; Future; Expected date: 02/23/2023      - ARLENE 7/6/23 by 17wk US  - Initial prenatal labs WNL, rubella non-immune    - Hgb electrophoresis, Carrier screening: neg  - Aneuploidy screening: discussed quad screen patient would like to complete today   - PNV: taking   - ASA: low risk   - msAFP: with quad  - MFM US: anatomy wnl but incomplete, will repeat first week of April   - 1hr GTT: 24-28wga  - CBC: 24-28wga  - Rhogam: O neg will need rhogam at 28wga   - Tdap: 28wga   - consents: to be signed at future visit       Pain and bleeding precautions given  Follow-up: 4 weeks

## 2023-02-27 LAB
# FETUSES US: NORMAL
2ND TRIMESTER 4 SCREEN PNL SERPL: NEGATIVE
2ND TRIMESTER 4 SCREEN SERPL-IMP: NORMAL
AFP MOM SERPL: 1.04
AFP SERPL-MCNC: 56 NG/ML
AGE AT DELIVERY: 24
B-HCG MOM SERPL: 0.89
B-HCG SERPL-ACNC: 16.7 IU/ML
FET TS 21 RISK FROM MAT AGE: NORMAL
GA (DAYS): 0 D
GA (WEEKS): 21 WK
GA METHOD: NORMAL
IDDM PATIENT QL: NORMAL
INHIBIN A MOM SERPL: 1
INHIBIN A SERPL-MCNC: 162.8 PG/ML
SMOKING STATUS FTND: NO
TS 18 RISK FETUS: NORMAL
TS 21 RISK FETUS: NORMAL
U ESTRIOL MOM SERPL: 0.97
U ESTRIOL SERPL-MCNC: 2.71 NG/ML

## 2023-03-23 ENCOUNTER — ROUTINE PRENATAL (OUTPATIENT)
Dept: OBSTETRICS AND GYNECOLOGY | Facility: CLINIC | Age: 23
End: 2023-03-23
Payer: MEDICAID

## 2023-03-23 VITALS
BODY MASS INDEX: 33.35 KG/M2 | SYSTOLIC BLOOD PRESSURE: 112 MMHG | DIASTOLIC BLOOD PRESSURE: 73 MMHG | WEIGHT: 194.31 LBS

## 2023-03-23 DIAGNOSIS — Z3A.25 25 WEEKS GESTATION OF PREGNANCY: Primary | ICD-10-CM

## 2023-03-23 PROCEDURE — 99999 PR PBB SHADOW E&M-EST. PATIENT-LVL II: ICD-10-PCS | Mod: PBBFAC,,, | Performed by: STUDENT IN AN ORGANIZED HEALTH CARE EDUCATION/TRAINING PROGRAM

## 2023-03-23 PROCEDURE — 99212 OFFICE O/P EST SF 10 MIN: CPT | Mod: PBBFAC,PO | Performed by: STUDENT IN AN ORGANIZED HEALTH CARE EDUCATION/TRAINING PROGRAM

## 2023-03-23 PROCEDURE — 99999 PR PBB SHADOW E&M-EST. PATIENT-LVL II: CPT | Mod: PBBFAC,,, | Performed by: STUDENT IN AN ORGANIZED HEALTH CARE EDUCATION/TRAINING PROGRAM

## 2023-03-23 PROCEDURE — 99212 PR OFFICE/OUTPT VISIT, EST, LEVL II, 10-19 MIN: ICD-10-PCS | Mod: TH,S$PBB,, | Performed by: STUDENT IN AN ORGANIZED HEALTH CARE EDUCATION/TRAINING PROGRAM

## 2023-03-23 PROCEDURE — 99212 OFFICE O/P EST SF 10 MIN: CPT | Mod: TH,S$PBB,, | Performed by: STUDENT IN AN ORGANIZED HEALTH CARE EDUCATION/TRAINING PROGRAM

## 2023-03-23 NOTE — PROGRESS NOTES
Reason for Visit   Routine Prenatal Visit    HPI   22 y.o., at 25w0d by Estimated Date of Delivery: 23    Patient feels well today, no complaints    Contractions: No   Bleeding: No   Loss of fluid: No   Fetal movement: Yes   Nausea: No   Vomiting: No   Headache: No     Pregnancy dating, labs, ultrasound reports, prenatal testing, and problem list; prior records and results; and available outside records were reviewed and updated in EMR.        Current Outpatient Medications:     azithromycin (ZITHROMAX) 500 MG tablet, Take 2 tablets by mouth orally after your next meal. If you throw up, repeat the dose., Disp: 4 tablet, Rfl: 1    prenatal 168/iron/folic/omega3 (ONE-A-DAY PRENATAL-1 ORAL), Take by mouth., Disp: , Rfl:    Exam   /73   Wt 88.1 kg (194 lb 4.8 oz)   LMP 10/17/2022 (Exact Date)   BMI 33.35 kg/m²     GENERAL: No acute distress  ABD: Gravid  Fundal height: 24 cm  FHR: 150    Assessment and Plan   25 weeks gestation of pregnancy    - ARLENE 23 by 17wk US  - Initial prenatal labs WNL, rubella non-immune (will need PP MMR)  - Hgb electrophoresis, Carrier screening: neg  - Aneuploidy 23: quad negative  - PNV: taking   - ASA: low risk   - msAFP 23: negative   - MFM US: anatomy wnl incomplete, repeat in 6 weeks to be scheduled today    - 1hr GTT: ordered   - CBC: ordered  - Rhogam: O neg will need rhogam at 28wga   - Tdap: 28wga   - consents: signed today      labor precautions given  Follow-up: 2 weeks     Angi Quarles MS3    Stephanie Heaney, MD OBGYN [facility]

## 2023-04-03 ENCOUNTER — PROCEDURE VISIT (OUTPATIENT)
Dept: MATERNAL FETAL MEDICINE | Facility: CLINIC | Age: 23
End: 2023-04-03
Payer: MEDICAID

## 2023-04-03 DIAGNOSIS — Z3A.25 25 WEEKS GESTATION OF PREGNANCY: ICD-10-CM

## 2023-04-03 PROCEDURE — 76816 OB US FOLLOW-UP PER FETUS: CPT | Mod: PBBFAC,PO | Performed by: OBSTETRICS & GYNECOLOGY

## 2023-04-03 PROCEDURE — 76816 US OB/GYN PROCEDURE (VIEWPOINT): ICD-10-PCS | Mod: 26,S$PBB,, | Performed by: OBSTETRICS & GYNECOLOGY

## 2023-04-04 ENCOUNTER — TELEPHONE (OUTPATIENT)
Dept: OBSTETRICS AND GYNECOLOGY | Facility: CLINIC | Age: 23
End: 2023-04-04
Payer: MEDICAID

## 2023-04-04 DIAGNOSIS — Z3A.26 26 WEEKS GESTATION OF PREGNANCY: Primary | ICD-10-CM

## 2023-04-04 NOTE — TELEPHONE ENCOUNTER
----- Message from Zaida Daniel MD sent at 4/3/2023  3:56 PM CDT -----  Hi, just messaged you on Teams but wanted to be sure you saw my message.  I think this fetus has a left sided SVC, which is considered a normal variant, but it can cause abnormal appearing cardiac views.  Would recommend referral to pediatric cardiology for fetal echocardiogram.  This was not ordered and these findings were not communicated to the patient.  Please let me know if you have any questions!  Zaida Daneil

## 2023-04-04 NOTE — TELEPHONE ENCOUNTER
#[id number] Ashish     Called patient to discuss US findings (Cardiac images are suspicious for a left sided SVC and dilated coronary sinus) and recommendation for fetal echo, repeat US at 32 weeks to reassess placenta. All questions answered.       Peds cards consult ordered for echo

## 2023-04-06 ENCOUNTER — TELEPHONE (OUTPATIENT)
Dept: OBSTETRICS AND GYNECOLOGY | Facility: CLINIC | Age: 23
End: 2023-04-06
Payer: MEDICAID

## 2023-04-06 ENCOUNTER — ROUTINE PRENATAL (OUTPATIENT)
Dept: OBSTETRICS AND GYNECOLOGY | Facility: CLINIC | Age: 23
End: 2023-04-06
Payer: MEDICAID

## 2023-04-06 VITALS — BODY MASS INDEX: 33.7 KG/M2 | SYSTOLIC BLOOD PRESSURE: 102 MMHG | WEIGHT: 196.31 LBS | DIASTOLIC BLOOD PRESSURE: 70 MMHG

## 2023-04-06 DIAGNOSIS — Z23 NEED FOR TETANUS, DIPHTHERIA, AND ACELLULAR PERTUSSIS (TDAP) VACCINE: Primary | ICD-10-CM

## 2023-04-06 DIAGNOSIS — O26.899 RH NEGATIVE STATE IN ANTEPARTUM PERIOD: ICD-10-CM

## 2023-04-06 DIAGNOSIS — Z3A.27 27 WEEKS GESTATION OF PREGNANCY: Primary | ICD-10-CM

## 2023-04-06 DIAGNOSIS — Z67.91 RH NEGATIVE STATE IN ANTEPARTUM PERIOD: ICD-10-CM

## 2023-04-06 PROCEDURE — 99999 PR PBB SHADOW E&M-EST. PATIENT-LVL II: ICD-10-PCS | Mod: PBBFAC,,, | Performed by: STUDENT IN AN ORGANIZED HEALTH CARE EDUCATION/TRAINING PROGRAM

## 2023-04-06 PROCEDURE — 99999 PR PBB SHADOW E&M-EST. PATIENT-LVL II: CPT | Mod: PBBFAC,,, | Performed by: STUDENT IN AN ORGANIZED HEALTH CARE EDUCATION/TRAINING PROGRAM

## 2023-04-06 PROCEDURE — 99212 PR OFFICE/OUTPT VISIT, EST, LEVL II, 10-19 MIN: ICD-10-PCS | Mod: TH,S$PBB,, | Performed by: STUDENT IN AN ORGANIZED HEALTH CARE EDUCATION/TRAINING PROGRAM

## 2023-04-06 PROCEDURE — 99212 OFFICE O/P EST SF 10 MIN: CPT | Mod: TH,S$PBB,, | Performed by: STUDENT IN AN ORGANIZED HEALTH CARE EDUCATION/TRAINING PROGRAM

## 2023-04-06 PROCEDURE — 99212 OFFICE O/P EST SF 10 MIN: CPT | Mod: PBBFAC,PO | Performed by: STUDENT IN AN ORGANIZED HEALTH CARE EDUCATION/TRAINING PROGRAM

## 2023-04-06 NOTE — PROGRESS NOTES
Reason for Visit   Routine Prenatal Visit   Elder #[id number]  HPI   23 y.o., at 27w0d by Estimated Date of Delivery: 23    Patient feels well today, no complaints    Contractions: No   Bleeding: No   Loss of fluid: No   Fetal movement: Yes   Nausea: No   Vomiting: No   Headache: No     Pregnancy dating, labs, ultrasound reports, prenatal testing, and problem list; prior records and results; and available outside records were reviewed and updated in EMR.        Current Outpatient Medications:     azithromycin (ZITHROMAX) 500 MG tablet, Take 2 tablets by mouth orally after your next meal. If you throw up, repeat the dose., Disp: 4 tablet, Rfl: 1    prenatal 168/iron/folic/omega3 (ONE-A-DAY PRENATAL-1 ORAL), Take by mouth., Disp: , Rfl:    Exam   /70   Wt 89 kg (196 lb 5.1 oz)   LMP 10/17/2022 (Exact Date)   BMI 33.70 kg/m²     GENERAL: No acute distress  ABD: Gravid  Fundal height: 26cm  FHR: 152    Assessment and Plan   27 weeks gestation of pregnancy    - ARLENE 23 by 17wk US  - Initial prenatal labs WNL, rubella non-immune (will need PP MMR)  - Hgb electrophoresis, Carrier screening: neg  - Aneuploidy 23: quad negative  - PNV: taking   - ASA: low risk   - msAFP 23: negative   - MFM US: anatomy wnl incomplete, repeat 4/3 suspicious for a left sided SVC and dilated coronary sinus. Cardiac imaging remains incomplete  - peds cards referral ordered for fetal echo  - repeat US to reassess placental location at 32wga  - 1hr GTT: ordered, needs to be completed (patient will get scheduled today)   - CBC: ordered, needs to be completed  - Rhogam: O neg will need rhogam at 28wga, scheduled for next week  - Tdap: scheduled for next week  - consents: signed 3/23/23      labor precautions given  Follow-up: 2 weeks       Stephanie Heaney, MD OBGYN [facility]

## 2023-04-06 NOTE — TELEPHONE ENCOUNTER
With the assistance of  ID# [id number] (Destiny) with Language [employer], called patient to get her scheduled for both her TDAP and Rhogam injections per Dr. Potts request. Patient scheduled for both injections next week on Friday, 04/14/23, at 09:15 AM here at [facility]. Patient verbalized understanding. No other questions or concerns.

## 2023-04-06 NOTE — TELEPHONE ENCOUNTER
Good Morning Dr. Potts,    I just got off the phone with Vilma to schedule her for both her TDAP and Rhogam injections. She is scheduled next week on Friday, 04/14/23, at 09:15 AM. Do you mind signing both the pended TDAP and Rhogam orders?    Thanks in advance!    -Tigist SELF

## 2023-04-14 ENCOUNTER — CLINICAL SUPPORT (OUTPATIENT)
Dept: OBSTETRICS AND GYNECOLOGY | Facility: CLINIC | Age: 23
End: 2023-04-14
Payer: MEDICAID

## 2023-04-14 DIAGNOSIS — Z23 NEED FOR TETANUS, DIPHTHERIA, AND ACELLULAR PERTUSSIS (TDAP) VACCINE: ICD-10-CM

## 2023-04-14 DIAGNOSIS — O26.899 RH NEGATIVE STATE IN ANTEPARTUM PERIOD: ICD-10-CM

## 2023-04-14 DIAGNOSIS — Z67.91 RH NEGATIVE STATE IN ANTEPARTUM PERIOD: ICD-10-CM

## 2023-04-14 PROCEDURE — 90471 IMMUNIZATION ADMIN: CPT | Mod: PBBFAC,PO

## 2023-04-14 PROCEDURE — 90715 TDAP VACCINE 7 YRS/> IM: CPT | Mod: PBBFAC,PO

## 2023-04-14 NOTE — PROGRESS NOTES
Patient given TDAP in RIGHT deltoid. [place] VIS given. Patient asked to wait 15 min in the lobby to monitor for adverse reaction. Patient verbalized understanding. Patient has received immunizations in the past without any complications. Patient tolerated well.    Here for rhogam injection, no complaints at this time. Verified patient is RH negative. Rhogam patient identification card given to patient. Administered in LEFT deltoid. No complaint of pain before or after injection. Patient advised to wait 15 minutes in lobby before leaving and report any adverse reactions. Report any adverse reaction. Patient verbalized understanding.        07-Sep-2019 16:46

## 2023-04-20 ENCOUNTER — ROUTINE PRENATAL (OUTPATIENT)
Dept: OBSTETRICS AND GYNECOLOGY | Facility: CLINIC | Age: 23
End: 2023-04-20
Payer: MEDICAID

## 2023-04-20 ENCOUNTER — LAB VISIT (OUTPATIENT)
Dept: LAB | Facility: HOSPITAL | Age: 23
End: 2023-04-20
Payer: MEDICAID

## 2023-04-20 VITALS — WEIGHT: 197.5 LBS | DIASTOLIC BLOOD PRESSURE: 76 MMHG | BODY MASS INDEX: 33.9 KG/M2 | SYSTOLIC BLOOD PRESSURE: 138 MMHG

## 2023-04-20 DIAGNOSIS — Z3A.25 25 WEEKS GESTATION OF PREGNANCY: ICD-10-CM

## 2023-04-20 DIAGNOSIS — Z3A.29 29 WEEKS GESTATION OF PREGNANCY: Primary | ICD-10-CM

## 2023-04-20 LAB
ERYTHROCYTE [DISTWIDTH] IN BLOOD BY AUTOMATED COUNT: 14 % (ref 11.5–14.5)
GLUCOSE SERPL-MCNC: 121 MG/DL (ref 70–140)
HCT VFR BLD AUTO: 32.4 % (ref 37–48.5)
HGB BLD-MCNC: 10.8 G/DL (ref 12–16)
MCH RBC QN AUTO: 29.1 PG (ref 27–31)
MCHC RBC AUTO-ENTMCNC: 33.3 G/DL (ref 32–36)
MCV RBC AUTO: 87 FL (ref 82–98)
PLATELET # BLD AUTO: 272 K/UL (ref 150–450)
PMV BLD AUTO: 9.9 FL (ref 9.2–12.9)
RBC # BLD AUTO: 3.71 M/UL (ref 4–5.4)
WBC # BLD AUTO: 12.22 K/UL (ref 3.9–12.7)

## 2023-04-20 PROCEDURE — 99212 OFFICE O/P EST SF 10 MIN: CPT | Mod: TH,S$PBB,, | Performed by: STUDENT IN AN ORGANIZED HEALTH CARE EDUCATION/TRAINING PROGRAM

## 2023-04-20 PROCEDURE — 82950 GLUCOSE TEST: CPT | Performed by: STUDENT IN AN ORGANIZED HEALTH CARE EDUCATION/TRAINING PROGRAM

## 2023-04-20 PROCEDURE — 99212 PR OFFICE/OUTPT VISIT, EST, LEVL II, 10-19 MIN: ICD-10-PCS | Mod: TH,S$PBB,, | Performed by: STUDENT IN AN ORGANIZED HEALTH CARE EDUCATION/TRAINING PROGRAM

## 2023-04-20 PROCEDURE — 99212 OFFICE O/P EST SF 10 MIN: CPT | Mod: PBBFAC,PO | Performed by: STUDENT IN AN ORGANIZED HEALTH CARE EDUCATION/TRAINING PROGRAM

## 2023-04-20 PROCEDURE — 85027 COMPLETE CBC AUTOMATED: CPT | Performed by: STUDENT IN AN ORGANIZED HEALTH CARE EDUCATION/TRAINING PROGRAM

## 2023-04-20 PROCEDURE — 99999 PR PBB SHADOW E&M-EST. PATIENT-LVL II: ICD-10-PCS | Mod: PBBFAC,,, | Performed by: STUDENT IN AN ORGANIZED HEALTH CARE EDUCATION/TRAINING PROGRAM

## 2023-04-20 PROCEDURE — 99999 PR PBB SHADOW E&M-EST. PATIENT-LVL II: CPT | Mod: PBBFAC,,, | Performed by: STUDENT IN AN ORGANIZED HEALTH CARE EDUCATION/TRAINING PROGRAM

## 2023-04-20 PROCEDURE — 36415 COLL VENOUS BLD VENIPUNCTURE: CPT | Performed by: STUDENT IN AN ORGANIZED HEALTH CARE EDUCATION/TRAINING PROGRAM

## 2023-04-20 NOTE — PROGRESS NOTES
Reason for Visit   Routine Prenatal Visit   Rigoberto #[id number]  HPI   23 y.o., at 29w0d by Estimated Date of Delivery: 7/6/23    Patient feels well today, no complaints    Contractions: No   Bleeding: No   Loss of fluid: No   Fetal movement: Yes   Nausea: No   Vomiting: No   Headache: No     Pregnancy dating, labs, ultrasound reports, prenatal testing, and problem list; prior records and results; and available outside records were reviewed and updated in EMR.        Current Outpatient Medications:     azithromycin (ZITHROMAX) 500 MG tablet, Take 2 tablets by mouth orally after your next meal. If you throw up, repeat the dose., Disp: 4 tablet, Rfl: 1    prenatal 168/iron/folic/omega3 (ONE-A-DAY PRENATAL-1 ORAL), Take by mouth., Disp: , Rfl:    Exam   /76   Wt 89.6 kg (197 lb 8 oz)   LMP 10/17/2022 (Exact Date)   BMI 33.90 kg/m²     GENERAL: No acute distress  ABD: Gravid  Fundal height: 2cm  FHR: 135    Assessment and Plan   29 weeks gestation of pregnancy    - ARLENE 7/6/23 by 17wk US  - Initial prenatal labs WNL, rubella non-immune (will need PP MMR)  - Hgb electrophoresis, Carrier screening: neg  - Aneuploidy 2/23/23: quad negative  - PNV: taking   - ASA: low risk   - msAFP 2/23/23: negative   - MFM US: anatomy wnl incomplete, repeat 4/3 suspicious for a left sided SVC and dilated coronary sinus. Cardiac imaging remains incomplete  - peds cards referral ordered for fetal echo  - repeat US to reassess placental location at 32wga, ordered to be scheduled today  - 1hr GTT: in process  - CBC: Hgb 10.8, platelets 272  - Rhogam: given 4/23/23  - Tdap: given 4/14/23  - consents: signed 3/23/23  - repeat HIV, RPR: will complete at future visit   - Contraception: counseled today, considering   - Pediatrician: will schedule after delivery, does not have current peds MD for her other child   - Feeding plan: breast and formula   - GBS: 35-36 wga  - Labor anesthesia: does not plan to have epidural           labor precautions given  Follow-up: 2 weeks       Stephanie Heaney, MD OBGYN [facility]

## 2023-05-04 ENCOUNTER — ROUTINE PRENATAL (OUTPATIENT)
Dept: OBSTETRICS AND GYNECOLOGY | Facility: CLINIC | Age: 23
End: 2023-05-04
Payer: MEDICAID

## 2023-05-04 VITALS
DIASTOLIC BLOOD PRESSURE: 76 MMHG | WEIGHT: 208.81 LBS | BODY MASS INDEX: 35.84 KG/M2 | SYSTOLIC BLOOD PRESSURE: 128 MMHG

## 2023-05-04 DIAGNOSIS — Z3A.31 31 WEEKS GESTATION OF PREGNANCY: Primary | ICD-10-CM

## 2023-05-04 PROCEDURE — 99999 PR PBB SHADOW E&M-EST. PATIENT-LVL II: CPT | Mod: PBBFAC,,, | Performed by: STUDENT IN AN ORGANIZED HEALTH CARE EDUCATION/TRAINING PROGRAM

## 2023-05-04 PROCEDURE — 99999 PR PBB SHADOW E&M-EST. PATIENT-LVL II: ICD-10-PCS | Mod: PBBFAC,,, | Performed by: STUDENT IN AN ORGANIZED HEALTH CARE EDUCATION/TRAINING PROGRAM

## 2023-05-04 PROCEDURE — 99212 PR OFFICE/OUTPT VISIT, EST, LEVL II, 10-19 MIN: ICD-10-PCS | Mod: TH,S$PBB,, | Performed by: STUDENT IN AN ORGANIZED HEALTH CARE EDUCATION/TRAINING PROGRAM

## 2023-05-04 PROCEDURE — 99212 OFFICE O/P EST SF 10 MIN: CPT | Mod: TH,S$PBB,, | Performed by: STUDENT IN AN ORGANIZED HEALTH CARE EDUCATION/TRAINING PROGRAM

## 2023-05-04 PROCEDURE — 99212 OFFICE O/P EST SF 10 MIN: CPT | Mod: PBBFAC,PO | Performed by: STUDENT IN AN ORGANIZED HEALTH CARE EDUCATION/TRAINING PROGRAM

## 2023-05-04 NOTE — PROGRESS NOTES
Reason for Visit   Routine Prenatal Visit   Xenia #[id number]  HPI   23 y.o., at 31w0d by Estimated Date of Delivery: 7/6/23    Patient feels well today, no complaints    Contractions: No   Bleeding: No   Loss of fluid: No   Fetal movement: Yes   Nausea: No   Vomiting: No   Headache: No     Pregnancy dating, labs, ultrasound reports, prenatal testing, and problem list; prior records and results; and available outside records were reviewed and updated in EMR.        Current Outpatient Medications:     prenatal 168/iron/folic/omega3 (ONE-A-DAY PRENATAL-1 ORAL), Take by mouth., Disp: , Rfl:     azithromycin (ZITHROMAX) 500 MG tablet, Take 2 tablets by mouth orally after your next meal. If you throw up, repeat the dose. (Patient not taking: Reported on 5/4/2023), Disp: 4 tablet, Rfl: 1   Exam   /76   Wt 94.7 kg (208 lb 12.8 oz)   LMP 10/17/2022 (Exact Date)   BMI 35.84 kg/m²     GENERAL: No acute distress  ABD: Gravid  Fundal height: 33cm  FHR: 142    Assessment and Plan   31 weeks gestation of pregnancy    - ARLENE 7/6/23 by 17wk US  - Initial prenatal labs WNL, rubella non-immune (will need PP MMR)  - Hgb electrophoresis, Carrier screening: neg  - Aneuploidy 2/23/23: quad negative  - PNV: taking   - ASA: low risk   - msAFP 2/23/23: negative   - MFM US: anatomy wnl incomplete, repeat 4/3 suspicious for a left sided SVC and dilated coronary sinus. Cardiac imaging remains incomplete  - peds cards referral ordered for fetal echo  - repeat US to reassess placental location scheduled 5/12  - 1hr GTT: 121  - CBC: Hgb 10.8, platelets 272  - Rhogam: given 4/23/23  - Tdap: given 4/14/23  - consents: signed 3/23/23  - repeat HIV, RPR: will complete at future visit   - Contraception: counseled today, considering   - Pediatrician: will schedule after delivery, does not have current peds MD for her other child   - Feeding plan: breast and formula   - GBS: 35-36 wga  - Labor anesthesia: does not plan to have  epidural         labor precautions given  Follow-up: 2 weeks       Stephanie Heaney, MD OBGYN [facility]

## 2023-05-12 ENCOUNTER — PROCEDURE VISIT (OUTPATIENT)
Dept: MATERNAL FETAL MEDICINE | Facility: CLINIC | Age: 23
End: 2023-05-12
Payer: MEDICAID

## 2023-05-12 DIAGNOSIS — Z3A.29 29 WEEKS GESTATION OF PREGNANCY: ICD-10-CM

## 2023-05-12 PROCEDURE — 76816 OB US FOLLOW-UP PER FETUS: CPT | Mod: PBBFAC,PO | Performed by: OBSTETRICS & GYNECOLOGY

## 2023-05-12 PROCEDURE — 76817 TRANSVAGINAL US OBSTETRIC: CPT | Mod: 26,S$PBB,, | Performed by: OBSTETRICS & GYNECOLOGY

## 2023-05-12 PROCEDURE — 76816 US OB/GYN PROCEDURE (VIEWPOINT): ICD-10-PCS | Mod: 26,S$PBB,, | Performed by: OBSTETRICS & GYNECOLOGY

## 2023-05-12 PROCEDURE — 76817 US OB/GYN PROCEDURE (VIEWPOINT): ICD-10-PCS | Mod: 26,S$PBB,, | Performed by: OBSTETRICS & GYNECOLOGY

## 2023-05-18 ENCOUNTER — ROUTINE PRENATAL (OUTPATIENT)
Dept: OBSTETRICS AND GYNECOLOGY | Facility: CLINIC | Age: 23
End: 2023-05-18
Payer: MEDICAID

## 2023-05-18 VITALS
SYSTOLIC BLOOD PRESSURE: 104 MMHG | BODY MASS INDEX: 36.03 KG/M2 | DIASTOLIC BLOOD PRESSURE: 62 MMHG | WEIGHT: 209.88 LBS

## 2023-05-18 DIAGNOSIS — Z3A.33 33 WEEKS GESTATION OF PREGNANCY: Primary | ICD-10-CM

## 2023-05-18 PROCEDURE — 99212 PR OFFICE/OUTPT VISIT, EST, LEVL II, 10-19 MIN: ICD-10-PCS | Mod: TH,S$PBB,, | Performed by: STUDENT IN AN ORGANIZED HEALTH CARE EDUCATION/TRAINING PROGRAM

## 2023-05-18 PROCEDURE — 99999 PR PBB SHADOW E&M-EST. PATIENT-LVL II: ICD-10-PCS | Mod: PBBFAC,,, | Performed by: STUDENT IN AN ORGANIZED HEALTH CARE EDUCATION/TRAINING PROGRAM

## 2023-05-18 PROCEDURE — 99212 OFFICE O/P EST SF 10 MIN: CPT | Mod: PBBFAC,PO | Performed by: STUDENT IN AN ORGANIZED HEALTH CARE EDUCATION/TRAINING PROGRAM

## 2023-05-18 PROCEDURE — 99212 OFFICE O/P EST SF 10 MIN: CPT | Mod: TH,S$PBB,, | Performed by: STUDENT IN AN ORGANIZED HEALTH CARE EDUCATION/TRAINING PROGRAM

## 2023-05-18 PROCEDURE — 99999 PR PBB SHADOW E&M-EST. PATIENT-LVL II: CPT | Mod: PBBFAC,,, | Performed by: STUDENT IN AN ORGANIZED HEALTH CARE EDUCATION/TRAINING PROGRAM

## 2023-05-18 NOTE — PROGRESS NOTES
Reason for Visit   Routine Prenatal Visit   Olesya  #[id number]  HPI   23 y.o., at 33w0d by Estimated Date of Delivery: 7/6/23    Patient feels well today, no complaints    Contractions: No   Bleeding: No   Loss of fluid: No   Fetal movement: Yes   Nausea: No   Vomiting: No   Headache: No     Pregnancy dating, labs, ultrasound reports, prenatal testing, and problem list; prior records and results; and available outside records were reviewed and updated in EMR.        Current Outpatient Medications:     prenatal 168/iron/folic/omega3 (ONE-A-DAY PRENATAL-1 ORAL), Take by mouth., Disp: , Rfl:     azithromycin (ZITHROMAX) 500 MG tablet, Take 2 tablets by mouth orally after your next meal. If you throw up, repeat the dose. (Patient not taking: Reported on 5/4/2023), Disp: 4 tablet, Rfl: 1   Exam   /62   Wt 95.2 kg (209 lb 14.4 oz)   LMP 10/17/2022 (Exact Date)   BMI 36.03 kg/m²     GENERAL: No acute distress  ABD: Gravid  Fundal height: 34cm  FHR: 139    Assessment and Plan   33 weeks gestation of pregnancy    - ARLENE 7/6/23 by 17wk US  - Initial prenatal labs WNL, rubella non-immune (will need PP MMR)  - Hgb electrophoresis, Carrier screening: neg  - Aneuploidy 2/23/23: quad negative  - PNV: taking   - ASA: low risk   - msAFP 2/23/23: negative   - MFM US: anatomy wnl incomplete, repeat 4/3 suspicious for a left sided SVC and dilated coronary sinus. Cardiac imaging remains incomplete  - peds cards referral ordered for fetal echo (still not completed?)  - repeat US to reassess placental location 5/12: Placental edge-to-cervical os distance 42 mm, low lying placenta resolved  - 1hr GTT: 121  - CBC: Hgb 10.8, platelets 272  - Rhogam: given 4/23/23  - Tdap: given 4/14/23  - consents: signed 3/23/23  - repeat HIV, RPR: ordered  - Contraception: would like vaginal ring after delivery   - Pediatrician: will schedule after delivery, does not have current peds MD for her other child   - Feeding plan:  breast and formula   - GBS: 35-36 wga  - Labor anesthesia: does not plan to have epidural  - does not desire circ         labor precautions given  Follow-up: 2 weeks       Stephanie Heaney, MD OBGYN [facility]

## 2023-06-01 ENCOUNTER — ROUTINE PRENATAL (OUTPATIENT)
Dept: OBSTETRICS AND GYNECOLOGY | Facility: CLINIC | Age: 23
End: 2023-06-01
Payer: MEDICAID

## 2023-06-01 VITALS
WEIGHT: 207.13 LBS | BODY MASS INDEX: 35.55 KG/M2 | SYSTOLIC BLOOD PRESSURE: 111 MMHG | DIASTOLIC BLOOD PRESSURE: 71 MMHG

## 2023-06-01 DIAGNOSIS — Z3A.35 35 WEEKS GESTATION OF PREGNANCY: Primary | ICD-10-CM

## 2023-06-01 PROCEDURE — 99999 PR PBB SHADOW E&M-EST. PATIENT-LVL II: ICD-10-PCS | Mod: PBBFAC,,, | Performed by: STUDENT IN AN ORGANIZED HEALTH CARE EDUCATION/TRAINING PROGRAM

## 2023-06-01 PROCEDURE — 99212 OFFICE O/P EST SF 10 MIN: CPT | Mod: TH,S$PBB,, | Performed by: STUDENT IN AN ORGANIZED HEALTH CARE EDUCATION/TRAINING PROGRAM

## 2023-06-01 PROCEDURE — 99212 OFFICE O/P EST SF 10 MIN: CPT | Mod: PBBFAC,PO | Performed by: STUDENT IN AN ORGANIZED HEALTH CARE EDUCATION/TRAINING PROGRAM

## 2023-06-01 PROCEDURE — 99212 PR OFFICE/OUTPT VISIT, EST, LEVL II, 10-19 MIN: ICD-10-PCS | Mod: TH,S$PBB,, | Performed by: STUDENT IN AN ORGANIZED HEALTH CARE EDUCATION/TRAINING PROGRAM

## 2023-06-01 PROCEDURE — 99999 PR PBB SHADOW E&M-EST. PATIENT-LVL II: CPT | Mod: PBBFAC,,, | Performed by: STUDENT IN AN ORGANIZED HEALTH CARE EDUCATION/TRAINING PROGRAM

## 2023-06-01 PROCEDURE — 87081 CULTURE SCREEN ONLY: CPT | Performed by: STUDENT IN AN ORGANIZED HEALTH CARE EDUCATION/TRAINING PROGRAM

## 2023-06-01 NOTE — PROGRESS NOTES
Reason for Visit   Routine Prenatal Visit   Aletha  #[id number]  HPI   23 y.o., at 35w0d by Estimated Date of Delivery: 7/6/23    Patient feels well today, having some contractions     Contractions: yes   Bleeding: No   Loss of fluid: No   Fetal movement: Yes   Nausea: No   Vomiting: No   Headache: No     Pregnancy dating, labs, ultrasound reports, prenatal testing, and problem list; prior records and results; and available outside records were reviewed and updated in EMR.        Current Outpatient Medications:     prenatal 168/iron/folic/omega3 (ONE-A-DAY PRENATAL-1 ORAL), Take by mouth., Disp: , Rfl:     azithromycin (ZITHROMAX) 500 MG tablet, Take 2 tablets by mouth orally after your next meal. If you throw up, repeat the dose. (Patient not taking: Reported on 5/4/2023), Disp: 4 tablet, Rfl: 1   Exam   /71   Wt 93.9 kg (207 lb 2 oz)   LMP 10/17/2022 (Exact Date)   BMI 35.55 kg/m²     GENERAL: No acute distress  ABD: Gravid  Fundal height:  35cm  FHR: 132  SVE: 1/thick/high    Assessment and Plan   35 weeks gestation of pregnancy    - ARLENE 7/6/23 by 17wk US  - Initial prenatal labs WNL, rubella non-immune (will need PP MMR)  - Hgb electrophoresis, Carrier screening: neg  - Aneuploidy 2/23/23: quad negative  - PNV: taking   - ASA: low risk   - msAFP 2/23/23: negative   - MFM US: anatomy wnl incomplete, repeat 4/3 suspicious for a left sided SVC and dilated coronary sinus. Cardiac imaging remains incomplete  - peds cards referral ordered for fetal echo (still not completed?)  - repeat US to reassess placental location 5/12: Placental edge-to-cervical os distance 42 mm, low lying placenta resolved  - 1hr GTT: 121  - CBC: Hgb 10.8, platelets 272  - Rhogam: given 4/23/23  - Tdap: given 4/14/23  - consents: signed 3/23/23  - repeat HIV, RPR: ordered  - Contraception: would like vaginal ring after delivery   - Pediatrician: will schedule after delivery, does not have current peds MD for her  other child   - Feeding plan: breast and formula   - GBS: collected today   - Labor anesthesia: does not plan to have epidural  - does not desire circ         labor precautions given  Follow-up: 1 week       Stephanie Heaney, MD OBGYN [facility]

## 2023-06-05 LAB — BACTERIA SPEC AEROBE CULT: NORMAL

## 2023-06-08 ENCOUNTER — ROUTINE PRENATAL (OUTPATIENT)
Dept: OBSTETRICS AND GYNECOLOGY | Facility: CLINIC | Age: 23
End: 2023-06-08
Payer: MEDICAID

## 2023-06-08 VITALS
SYSTOLIC BLOOD PRESSURE: 120 MMHG | BODY MASS INDEX: 35.95 KG/M2 | WEIGHT: 209.44 LBS | DIASTOLIC BLOOD PRESSURE: 76 MMHG

## 2023-06-08 DIAGNOSIS — Z3A.36 36 WEEKS GESTATION OF PREGNANCY: Primary | ICD-10-CM

## 2023-06-08 PROCEDURE — 99999 PR PBB SHADOW E&M-EST. PATIENT-LVL II: CPT | Mod: PBBFAC,,, | Performed by: STUDENT IN AN ORGANIZED HEALTH CARE EDUCATION/TRAINING PROGRAM

## 2023-06-08 PROCEDURE — 99212 PR OFFICE/OUTPT VISIT, EST, LEVL II, 10-19 MIN: ICD-10-PCS | Mod: TH,S$PBB,, | Performed by: STUDENT IN AN ORGANIZED HEALTH CARE EDUCATION/TRAINING PROGRAM

## 2023-06-08 PROCEDURE — 99999 PR PBB SHADOW E&M-EST. PATIENT-LVL II: ICD-10-PCS | Mod: PBBFAC,,, | Performed by: STUDENT IN AN ORGANIZED HEALTH CARE EDUCATION/TRAINING PROGRAM

## 2023-06-08 PROCEDURE — 99212 OFFICE O/P EST SF 10 MIN: CPT | Mod: TH,S$PBB,, | Performed by: STUDENT IN AN ORGANIZED HEALTH CARE EDUCATION/TRAINING PROGRAM

## 2023-06-08 PROCEDURE — 99212 OFFICE O/P EST SF 10 MIN: CPT | Mod: PBBFAC,PO | Performed by: STUDENT IN AN ORGANIZED HEALTH CARE EDUCATION/TRAINING PROGRAM

## 2023-06-08 NOTE — PROGRESS NOTES
Reason for Visit   Routine Prenatal Visit   Irvin  #[id number]  HPI   23 y.o., at 36w0d by Estimated Date of Delivery: 7/6/23    Patient feels well today, tired     Contractions: No   Bleeding: No   Loss of fluid: No   Fetal movement: Yes   Nausea: No   Vomiting: No   Headache: No     Pregnancy dating, labs, ultrasound reports, prenatal testing, and problem list; prior records and results; and available outside records were reviewed and updated in EMR.        Current Outpatient Medications:     prenatal 168/iron/folic/omega3 (ONE-A-DAY PRENATAL-1 ORAL), Take by mouth., Disp: , Rfl:     azithromycin (ZITHROMAX) 500 MG tablet, Take 2 tablets by mouth orally after your next meal. If you throw up, repeat the dose. (Patient not taking: Reported on 5/4/2023), Disp: 4 tablet, Rfl: 1   Exam   /76   Wt 95 kg (209 lb 7 oz)   LMP 10/17/2022 (Exact Date)   BMI 35.95 kg/m²     GENERAL: No acute distress  ABD: Gravid  Fundal height:  37  FHR: 155  SVE: not rechecked today     Assessment and Plan   36 weeks gestation of pregnancy    - ARLENE 7/6/23 by 17wk US  - Initial prenatal labs WNL, rubella non-immune (will need PP MMR)  - Hgb electrophoresis, Carrier screening: neg  - Aneuploidy 2/23/23: quad negative  - PNV: taking   - ASA: low risk   - msAFP 2/23/23: negative   - MFM US: anatomy wnl incomplete, repeat 4/3 suspicious for a left sided SVC and dilated coronary sinus. Cardiac imaging remains incomplete  - peds cards referral ordered for fetal echo (still not completed)  - repeat US to reassess placental location 5/12: Placental edge-to-cervical os distance 42 mm, low lying placenta resolved  - 1hr GTT: 121  - CBC: Hgb 10.8, platelets 272  - Rhogam: given 4/23/23  - Tdap: given 4/14/23  - consents: signed 3/23/23  - repeat HIV, RPR: ordered  - Contraception: would like vaginal ring after delivery   - Pediatrician: will schedule after delivery, does not have current peds MD for her other child   -  Feeding plan: breast and formula   - GBS: NEG  - Labor anesthesia: does not plan to have epidural  - does not desire circ         labor precautions given  Follow-up: 1 week       Stephanie Heaney, MD OBGYN [facility]

## 2023-06-15 ENCOUNTER — ROUTINE PRENATAL (OUTPATIENT)
Dept: OBSTETRICS AND GYNECOLOGY | Facility: CLINIC | Age: 23
End: 2023-06-15
Payer: MEDICAID

## 2023-06-15 VITALS
DIASTOLIC BLOOD PRESSURE: 74 MMHG | BODY MASS INDEX: 40.19 KG/M2 | WEIGHT: 234.13 LBS | SYSTOLIC BLOOD PRESSURE: 110 MMHG

## 2023-06-15 DIAGNOSIS — Z3A.37 37 WEEKS GESTATION OF PREGNANCY: Primary | ICD-10-CM

## 2023-06-15 PROCEDURE — 99212 OFFICE O/P EST SF 10 MIN: CPT | Mod: TH,S$PBB,, | Performed by: STUDENT IN AN ORGANIZED HEALTH CARE EDUCATION/TRAINING PROGRAM

## 2023-06-15 PROCEDURE — 99999 PR PBB SHADOW E&M-EST. PATIENT-LVL II: CPT | Mod: PBBFAC,,, | Performed by: STUDENT IN AN ORGANIZED HEALTH CARE EDUCATION/TRAINING PROGRAM

## 2023-06-15 PROCEDURE — 99212 OFFICE O/P EST SF 10 MIN: CPT | Mod: PBBFAC,PO | Performed by: STUDENT IN AN ORGANIZED HEALTH CARE EDUCATION/TRAINING PROGRAM

## 2023-06-15 PROCEDURE — 99999 PR PBB SHADOW E&M-EST. PATIENT-LVL II: ICD-10-PCS | Mod: PBBFAC,,, | Performed by: STUDENT IN AN ORGANIZED HEALTH CARE EDUCATION/TRAINING PROGRAM

## 2023-06-15 PROCEDURE — 99212 PR OFFICE/OUTPT VISIT, EST, LEVL II, 10-19 MIN: ICD-10-PCS | Mod: TH,S$PBB,, | Performed by: STUDENT IN AN ORGANIZED HEALTH CARE EDUCATION/TRAINING PROGRAM

## 2023-06-15 NOTE — PROGRESS NOTES
Reason for Visit   Routine Prenatal Visit   #[id number]  HPI   23 y.o., at 37w0d by Estimated Date of Delivery: 7/6/23    Patient feels well today, no complaints     Contractions: No   Bleeding: No   Loss of fluid: No   Fetal movement: Yes   Nausea: No   Vomiting: No   Headache: No     Pregnancy dating, labs, ultrasound reports, prenatal testing, and problem list; prior records and results; and available outside records were reviewed and updated in EMR.        Current Outpatient Medications:     prenatal 168/iron/folic/omega3 (ONE-A-DAY PRENATAL-1 ORAL), Take by mouth., Disp: , Rfl:     azithromycin (ZITHROMAX) 500 MG tablet, Take 2 tablets by mouth orally after your next meal. If you throw up, repeat the dose. (Patient not taking: Reported on 5/4/2023), Disp: 4 tablet, Rfl: 1   Exam   /74   Wt 106.2 kg (234 lb 2.1 oz)   LMP 10/17/2022 (Exact Date)   BMI 40.19 kg/m²     GENERAL: No acute distress  ABD: Gravid  Fundal height:  38  FHR: 163  SVE: not rechecked today   Vertex on vscan     Assessment and Plan   37 weeks gestation of pregnancy    - ARLENE 7/6/23 by 17wk US  - Initial prenatal labs WNL, rubella non-immune (will need PP MMR)  - Hgb electrophoresis, Carrier screening: neg  - Aneuploidy 2/23/23: quad negative  - PNV: taking   - ASA: low risk   - msAFP 2/23/23: negative   - MFM US: anatomy wnl incomplete, repeat 4/3 suspicious for a left sided SVC and dilated coronary sinus. Cardiac imaging remains incomplete  - peds cards referral ordered for fetal echo (not completed)  - repeat US to reassess placental location 5/12: Placental edge-to-cervical os distance 42 mm, low lying placenta resolved  - 1hr GTT: 121  - CBC: Hgb 10.8, platelets 272  - Rhogam: given 4/23/23  - Tdap: given 4/14/23  - consents: signed 3/23/23  - repeat HIV, RPR: ordered  - Contraception: would like vaginal ring after delivery/PP   - Pediatrician: will schedule after delivery, does not have current peds MD for her  other child   - Feeding plan: breast and formula   - GBS: NEG  - Labor anesthesia: does not plan to have epidural  - does not desire circ        Labor precautions given  Follow-up: 1 week       Stephanie Heaney, MD OBGYN [facility]

## 2023-06-22 ENCOUNTER — ROUTINE PRENATAL (OUTPATIENT)
Dept: OBSTETRICS AND GYNECOLOGY | Facility: CLINIC | Age: 23
End: 2023-06-22
Payer: MEDICAID

## 2023-06-22 VITALS
DIASTOLIC BLOOD PRESSURE: 80 MMHG | BODY MASS INDEX: 36.18 KG/M2 | SYSTOLIC BLOOD PRESSURE: 116 MMHG | WEIGHT: 210.75 LBS

## 2023-06-22 DIAGNOSIS — Z3A.38 38 WEEKS GESTATION OF PREGNANCY: Primary | ICD-10-CM

## 2023-06-22 PROCEDURE — 99212 OFFICE O/P EST SF 10 MIN: CPT | Mod: TH,S$PBB,, | Performed by: STUDENT IN AN ORGANIZED HEALTH CARE EDUCATION/TRAINING PROGRAM

## 2023-06-22 PROCEDURE — 99999 PR PBB SHADOW E&M-EST. PATIENT-LVL II: ICD-10-PCS | Mod: PBBFAC,,, | Performed by: STUDENT IN AN ORGANIZED HEALTH CARE EDUCATION/TRAINING PROGRAM

## 2023-06-22 PROCEDURE — 99212 PR OFFICE/OUTPT VISIT, EST, LEVL II, 10-19 MIN: ICD-10-PCS | Mod: TH,S$PBB,, | Performed by: STUDENT IN AN ORGANIZED HEALTH CARE EDUCATION/TRAINING PROGRAM

## 2023-06-22 PROCEDURE — 99999 PR PBB SHADOW E&M-EST. PATIENT-LVL II: CPT | Mod: PBBFAC,,, | Performed by: STUDENT IN AN ORGANIZED HEALTH CARE EDUCATION/TRAINING PROGRAM

## 2023-06-22 PROCEDURE — 99212 OFFICE O/P EST SF 10 MIN: CPT | Mod: PBBFAC,PO | Performed by: STUDENT IN AN ORGANIZED HEALTH CARE EDUCATION/TRAINING PROGRAM

## 2023-06-22 NOTE — PROGRESS NOTES
Reason for Visit   Routine Prenatal Visit   #[id number] Darryn PENALOZA   23 y.o., at 38w0d by Estimated Date of Delivery: 7/6/23    Patient feels well today, no complaints     Contractions: No   Bleeding: No   Loss of fluid: No   Fetal movement: Yes   Nausea: No   Vomiting: No   Headache: No     Pregnancy dating, labs, ultrasound reports, prenatal testing, and problem list; prior records and results; and available outside records were reviewed and updated in EMR.        Current Outpatient Medications:     prenatal 168/iron/folic/omega3 (ONE-A-DAY PRENATAL-1 ORAL), Take by mouth., Disp: , Rfl:     azithromycin (ZITHROMAX) 500 MG tablet, Take 2 tablets by mouth orally after your next meal. If you throw up, repeat the dose. (Patient not taking: Reported on 5/4/2023), Disp: 4 tablet, Rfl: 1   Exam   /80   Wt 95.6 kg (210 lb 12.2 oz)   LMP 10/17/2022 (Exact Date)   BMI 36.18 kg/m²     GENERAL: No acute distress  ABD: Gravid  Fundal height:  38  FHR: 138  SVE:  2/50/-3    Assessment and Plan   38 weeks gestation of pregnancy    - ARLENE 7/6/23 by 17wk US  - Initial prenatal labs WNL, rubella non-immune (will need PP MMR)  - Hgb electrophoresis, Carrier screening: neg  - Aneuploidy 2/23/23: quad negative  - PNV: taking   - ASA: low risk   - msAFP 2/23/23: negative   - MFM US: anatomy wnl incomplete, repeat 4/3 suspicious for a left sided SVC and dilated coronary sinus. Cardiac imaging remains incomplete  - peds cards referral ordered for fetal echo (not completed)  - repeat US to reassess placental location 5/12: Placental edge-to-cervical os distance 42 mm, low lying placenta resolved  - 1hr GTT: 121  - CBC: Hgb 10.8, platelets 272  - Rhogam: given 4/23/23  - Tdap: given 4/14/23  - consents: signed 3/23/23  - repeat HIV, RPR: ordered  - Contraception: would like vaginal ring after delivery/PP   - Pediatrician: will schedule after delivery, does not have current peds MD for her other child   - Feeding  plan: breast and formula   - GBS: NEG  - Labor anesthesia: does not plan to have epidural  - does not desire circ  - IOL on due date 7/6 at 6pm if not in labor         Labor precautions given  Follow-up: 1 week       Arabella Potts MD  OBGYN [facility]

## 2023-06-29 ENCOUNTER — ROUTINE PRENATAL (OUTPATIENT)
Dept: OBSTETRICS AND GYNECOLOGY | Facility: CLINIC | Age: 23
End: 2023-06-29
Payer: MEDICAID

## 2023-06-29 VITALS
BODY MASS INDEX: 36.67 KG/M2 | WEIGHT: 213.63 LBS | SYSTOLIC BLOOD PRESSURE: 111 MMHG | DIASTOLIC BLOOD PRESSURE: 73 MMHG

## 2023-06-29 DIAGNOSIS — Z34.93 PRENATAL CARE IN THIRD TRIMESTER: Primary | ICD-10-CM

## 2023-06-29 PROCEDURE — 99212 OFFICE O/P EST SF 10 MIN: CPT | Mod: PBBFAC,TH,PO | Performed by: NURSE PRACTITIONER

## 2023-06-29 PROCEDURE — 99212 PR OFFICE/OUTPT VISIT, EST, LEVL II, 10-19 MIN: ICD-10-PCS | Mod: S$PBB,TH,, | Performed by: NURSE PRACTITIONER

## 2023-06-29 PROCEDURE — 99999 PR PBB SHADOW E&M-EST. PATIENT-LVL II: CPT | Mod: PBBFAC,,, | Performed by: NURSE PRACTITIONER

## 2023-06-29 PROCEDURE — 99212 OFFICE O/P EST SF 10 MIN: CPT | Mod: S$PBB,TH,, | Performed by: NURSE PRACTITIONER

## 2023-06-29 PROCEDURE — 99999 PR PBB SHADOW E&M-EST. PATIENT-LVL II: ICD-10-PCS | Mod: PBBFAC,,, | Performed by: NURSE PRACTITIONER

## 2023-06-29 NOTE — PROGRESS NOTES
Here for routine OB appt. No complaints.  Reports good fetal movement.  Denies loss of fluid, vaginal bleeding or contractions.  Reviewed warning signs of labor and preeclampsia. Daily fetal movement counts reinforced.   All questions answered, pt verbalizes understanding

## 2023-07-04 ENCOUNTER — ANESTHESIA (OUTPATIENT)
Dept: OBSTETRICS AND GYNECOLOGY | Facility: HOSPITAL | Age: 23
End: 2023-07-04
Payer: MEDICAID

## 2023-07-04 ENCOUNTER — ANESTHESIA EVENT (OUTPATIENT)
Dept: OBSTETRICS AND GYNECOLOGY | Facility: HOSPITAL | Age: 23
End: 2023-07-04
Payer: MEDICAID

## 2023-07-04 ENCOUNTER — HOSPITAL ENCOUNTER (INPATIENT)
Facility: HOSPITAL | Age: 23
LOS: 2 days | Discharge: HOME | End: 2023-07-06
Attending: STUDENT IN AN ORGANIZED HEALTH CARE EDUCATION/TRAINING PROGRAM | Admitting: OBSTETRICS & GYNECOLOGY
Payer: MEDICAID

## 2023-07-04 DIAGNOSIS — O26.899 ABDOMINAL PAIN AFFECTING PREGNANCY: ICD-10-CM

## 2023-07-04 DIAGNOSIS — R10.9 ABDOMINAL PAIN AFFECTING PREGNANCY: ICD-10-CM

## 2023-07-04 LAB
ABO + RH BLD: NORMAL
BASOPHILS # BLD AUTO: 0.01 K/UL (ref 0–0.2)
BASOPHILS NFR BLD: 0.1 % (ref 0–1.9)
BLD GP AB SCN CELLS X3 SERPL QL: NORMAL
DIFFERENTIAL METHOD: ABNORMAL
EOSINOPHIL # BLD AUTO: 0.1 K/UL (ref 0–0.5)
EOSINOPHIL NFR BLD: 0.6 % (ref 0–8)
ERYTHROCYTE [DISTWIDTH] IN BLOOD BY AUTOMATED COUNT: 15.6 % (ref 11.5–14.5)
HBV SURFACE AG SERPL QL IA: NORMAL
HCT VFR BLD AUTO: 36.9 % (ref 37–48.5)
HGB BLD-MCNC: 12.5 G/DL (ref 12–16)
HIV 1+2 AB+HIV1 P24 AG SERPL QL IA: NORMAL
HIV1+2 IGG SERPL QL IA.RAPID: NORMAL
IMM GRANULOCYTES # BLD AUTO: 0.04 K/UL (ref 0–0.04)
IMM GRANULOCYTES NFR BLD AUTO: 0.3 % (ref 0–0.5)
LYMPHOCYTES # BLD AUTO: 2.8 K/UL (ref 1–4.8)
LYMPHOCYTES NFR BLD: 21.5 % (ref 18–48)
MCH RBC QN AUTO: 29.2 PG (ref 27–31)
MCHC RBC AUTO-ENTMCNC: 33.9 G/DL (ref 32–36)
MCV RBC AUTO: 86 FL (ref 82–98)
MONOCYTES # BLD AUTO: 0.8 K/UL (ref 0.3–1)
MONOCYTES NFR BLD: 6.2 % (ref 4–15)
NEUTROPHILS # BLD AUTO: 9.3 K/UL (ref 1.8–7.7)
NEUTROPHILS NFR BLD: 71.3 % (ref 38–73)
NRBC BLD-RTO: 0 /100 WBC
PCO2 BLDA: 41.3 MMHG
PH SMN: 7.35 [PH]
PLATELET # BLD AUTO: 247 K/UL (ref 150–450)
PMV BLD AUTO: 10.2 FL (ref 9.2–12.9)
PO2 BLDA: 54.9 MMHG
POC BASE DEFICIT: -3 MMOL/L
POC HCO3: 22.6 MMOL/L
POC PERFORMED BY: NORMAL
POC SATURATED O2: 60.5 %
RBC # BLD AUTO: 4.28 M/UL (ref 4–5.4)
SPECIMEN SOURCE: NORMAL
WBC # BLD AUTO: 12.98 K/UL (ref 3.9–12.7)

## 2023-07-04 PROCEDURE — 99211 OFF/OP EST MAY X REQ PHY/QHP: CPT

## 2023-07-04 PROCEDURE — 63600175 PHARM REV CODE 636 W HCPCS: Performed by: OBSTETRICS & GYNECOLOGY

## 2023-07-04 PROCEDURE — 51702 INSERT TEMP BLADDER CATH: CPT

## 2023-07-04 PROCEDURE — 36416 COLLJ CAPILLARY BLOOD SPEC: CPT

## 2023-07-04 PROCEDURE — 72200004 HC VAGINAL DELIVERY LEVEL I

## 2023-07-04 PROCEDURE — 25000003 PHARM REV CODE 250: Performed by: NURSE ANESTHETIST, CERTIFIED REGISTERED

## 2023-07-04 PROCEDURE — 11000001 HC ACUTE MED/SURG PRIVATE ROOM

## 2023-07-04 PROCEDURE — 59409 PRA ETRICAL CARE,VAG DELIV ONLY: ICD-10-PCS | Mod: QZ,,, | Performed by: NURSE ANESTHETIST, CERTIFIED REGISTERED

## 2023-07-04 PROCEDURE — 87389 HIV-1 AG W/HIV-1&-2 AB AG IA: CPT | Performed by: OBSTETRICS & GYNECOLOGY

## 2023-07-04 PROCEDURE — 72100002 HC LABOR CARE, 1ST 8 HOURS

## 2023-07-04 PROCEDURE — 27200710 HC EPIDURAL INFUSION PUMP SET: Performed by: UROLOGY

## 2023-07-04 PROCEDURE — 59409 OBSTETRICAL CARE: CPT | Mod: QZ,,, | Performed by: NURSE ANESTHETIST, CERTIFIED REGISTERED

## 2023-07-04 PROCEDURE — 59409 OBSTETRICAL CARE: CPT | Mod: GB,,, | Performed by: OBSTETRICS & GYNECOLOGY

## 2023-07-04 PROCEDURE — 86592 SYPHILIS TEST NON-TREP QUAL: CPT | Performed by: OBSTETRICS & GYNECOLOGY

## 2023-07-04 PROCEDURE — 99900035 HC TECH TIME PER 15 MIN (STAT)

## 2023-07-04 PROCEDURE — 36415 COLL VENOUS BLD VENIPUNCTURE: CPT | Performed by: OBSTETRICS & GYNECOLOGY

## 2023-07-04 PROCEDURE — 87340 HEPATITIS B SURFACE AG IA: CPT | Performed by: OBSTETRICS & GYNECOLOGY

## 2023-07-04 PROCEDURE — 86703 HIV-1/HIV-2 1 RESULT ANTBDY: CPT | Performed by: OBSTETRICS & GYNECOLOGY

## 2023-07-04 PROCEDURE — 82803 BLOOD GASES ANY COMBINATION: CPT

## 2023-07-04 PROCEDURE — 85025 COMPLETE CBC W/AUTO DIFF WBC: CPT | Performed by: OBSTETRICS & GYNECOLOGY

## 2023-07-04 PROCEDURE — 62326 NJX INTERLAMINAR LMBR/SAC: CPT | Performed by: NURSE ANESTHETIST, CERTIFIED REGISTERED

## 2023-07-04 PROCEDURE — 86900 BLOOD TYPING SEROLOGIC ABO: CPT | Performed by: OBSTETRICS & GYNECOLOGY

## 2023-07-04 PROCEDURE — C1751 CATH, INF, PER/CENT/MIDLINE: HCPCS | Performed by: UROLOGY

## 2023-07-04 PROCEDURE — 59409 PR OBSTETRICAL CARE,VAG DELIV ONLY: ICD-10-PCS | Mod: GB,,, | Performed by: OBSTETRICS & GYNECOLOGY

## 2023-07-04 RX ORDER — MISOPROSTOL 200 UG/1
800 TABLET ORAL
Status: DISCONTINUED | OUTPATIENT
Start: 2023-07-04 | End: 2023-07-06 | Stop reason: HOSPADM

## 2023-07-04 RX ORDER — LIDOCAINE HYDROCHLORIDE 10 MG/ML
10 INJECTION INFILTRATION; PERINEURAL ONCE AS NEEDED
Status: DISCONTINUED | OUTPATIENT
Start: 2023-07-04 | End: 2023-07-04

## 2023-07-04 RX ORDER — OXYTOCIN 10 [USP'U]/ML
10 INJECTION, SOLUTION INTRAMUSCULAR; INTRAVENOUS ONCE AS NEEDED
Status: DISCONTINUED | OUTPATIENT
Start: 2023-07-04 | End: 2023-07-06 | Stop reason: HOSPADM

## 2023-07-04 RX ORDER — DOCUSATE SODIUM 100 MG/1
200 CAPSULE, LIQUID FILLED ORAL 2 TIMES DAILY PRN
Status: DISCONTINUED | OUTPATIENT
Start: 2023-07-04 | End: 2023-07-06 | Stop reason: HOSPADM

## 2023-07-04 RX ORDER — HYDROCORTISONE 25 MG/G
CREAM TOPICAL 3 TIMES DAILY PRN
Status: DISCONTINUED | OUTPATIENT
Start: 2023-07-04 | End: 2023-07-06 | Stop reason: HOSPADM

## 2023-07-04 RX ORDER — IBUPROFEN 600 MG/1
600 TABLET ORAL EVERY 6 HOURS PRN
Status: DISCONTINUED | OUTPATIENT
Start: 2023-07-04 | End: 2023-07-06 | Stop reason: HOSPADM

## 2023-07-04 RX ORDER — OXYCODONE AND ACETAMINOPHEN 5; 325 MG/1; MG/1
1 TABLET ORAL EVERY 4 HOURS PRN
Status: DISCONTINUED | OUTPATIENT
Start: 2023-07-04 | End: 2023-07-06 | Stop reason: HOSPADM

## 2023-07-04 RX ORDER — OXYTOCIN/RINGER'S LACTATE 30/500 ML
334 PLASTIC BAG, INJECTION (ML) INTRAVENOUS ONCE
Status: COMPLETED | OUTPATIENT
Start: 2023-07-04 | End: 2023-07-04

## 2023-07-04 RX ORDER — SODIUM CHLORIDE, SODIUM LACTATE, POTASSIUM CHLORIDE, CALCIUM CHLORIDE 600; 310; 30; 20 MG/100ML; MG/100ML; MG/100ML; MG/100ML
INJECTION, SOLUTION INTRAVENOUS CONTINUOUS
Status: DISCONTINUED | OUTPATIENT
Start: 2023-07-04 | End: 2023-07-04

## 2023-07-04 RX ORDER — ACETAMINOPHEN 325 MG/1
650 TABLET ORAL EVERY 6 HOURS PRN
Status: DISCONTINUED | OUTPATIENT
Start: 2023-07-04 | End: 2023-07-06 | Stop reason: HOSPADM

## 2023-07-04 RX ORDER — CARBOPROST TROMETHAMINE 250 UG/ML
250 INJECTION, SOLUTION INTRAMUSCULAR
Status: DISCONTINUED | OUTPATIENT
Start: 2023-07-04 | End: 2023-07-06 | Stop reason: HOSPADM

## 2023-07-04 RX ORDER — FENTANYL/BUPIVACAINE/NS/PF 2MCG/ML-.1
PLASTIC BAG, INJECTION (ML) INJECTION
Status: COMPLETED
Start: 2023-07-04 | End: 2023-07-04

## 2023-07-04 RX ORDER — OXYTOCIN/RINGER'S LACTATE 30/500 ML
334 PLASTIC BAG, INJECTION (ML) INTRAVENOUS ONCE AS NEEDED
Status: DISCONTINUED | OUTPATIENT
Start: 2023-07-04 | End: 2023-07-04

## 2023-07-04 RX ORDER — PROCHLORPERAZINE EDISYLATE 5 MG/ML
5 INJECTION INTRAMUSCULAR; INTRAVENOUS EVERY 6 HOURS PRN
Status: DISCONTINUED | OUTPATIENT
Start: 2023-07-04 | End: 2023-07-04 | Stop reason: DRUGHIGH

## 2023-07-04 RX ORDER — METHYLERGONOVINE MALEATE 0.2 MG/ML
200 INJECTION INTRAVENOUS
Status: DISCONTINUED | OUTPATIENT
Start: 2023-07-04 | End: 2023-07-06 | Stop reason: HOSPADM

## 2023-07-04 RX ORDER — OXYTOCIN/RINGER'S LACTATE 30/500 ML
95 PLASTIC BAG, INJECTION (ML) INTRAVENOUS ONCE AS NEEDED
Status: DISCONTINUED | OUTPATIENT
Start: 2023-07-04 | End: 2023-07-04

## 2023-07-04 RX ORDER — DIPHENHYDRAMINE HCL 25 MG
25 CAPSULE ORAL EVERY 4 HOURS PRN
Status: DISCONTINUED | OUTPATIENT
Start: 2023-07-04 | End: 2023-07-06 | Stop reason: HOSPADM

## 2023-07-04 RX ORDER — OXYTOCIN/RINGER'S LACTATE 30/500 ML
95 PLASTIC BAG, INJECTION (ML) INTRAVENOUS ONCE
Status: DISCONTINUED | OUTPATIENT
Start: 2023-07-04 | End: 2023-07-04

## 2023-07-04 RX ORDER — MUPIROCIN 20 MG/G
OINTMENT TOPICAL
Status: DISCONTINUED | OUTPATIENT
Start: 2023-07-04 | End: 2023-07-04

## 2023-07-04 RX ORDER — CALCIUM CARBONATE 200(500)MG
500 TABLET,CHEWABLE ORAL 3 TIMES DAILY PRN
Status: DISCONTINUED | OUTPATIENT
Start: 2023-07-04 | End: 2023-07-06 | Stop reason: HOSPADM

## 2023-07-04 RX ORDER — OXYTOCIN/RINGER'S LACTATE 30/500 ML
95 PLASTIC BAG, INJECTION (ML) INTRAVENOUS ONCE
Status: COMPLETED | OUTPATIENT
Start: 2023-07-04 | End: 2023-07-04

## 2023-07-04 RX ORDER — OXYTOCIN 10 [USP'U]/ML
10 INJECTION, SOLUTION INTRAMUSCULAR; INTRAVENOUS ONCE AS NEEDED
Status: DISCONTINUED | OUTPATIENT
Start: 2023-07-04 | End: 2023-07-04

## 2023-07-04 RX ORDER — OXYCODONE AND ACETAMINOPHEN 10; 325 MG/1; MG/1
1 TABLET ORAL EVERY 4 HOURS PRN
Status: DISCONTINUED | OUTPATIENT
Start: 2023-07-04 | End: 2023-07-06 | Stop reason: HOSPADM

## 2023-07-04 RX ORDER — PROCHLORPERAZINE EDISYLATE 5 MG/ML
5 INJECTION INTRAMUSCULAR; INTRAVENOUS EVERY 6 HOURS PRN
Status: DISCONTINUED | OUTPATIENT
Start: 2023-07-04 | End: 2023-07-06 | Stop reason: HOSPADM

## 2023-07-04 RX ORDER — MISOPROSTOL 200 UG/1
800 TABLET ORAL ONCE AS NEEDED
Status: DISCONTINUED | OUTPATIENT
Start: 2023-07-04 | End: 2023-07-06 | Stop reason: HOSPADM

## 2023-07-04 RX ORDER — FENTANYL/BUPIVACAINE/NS/PF 2MCG/ML-.1
PLASTIC BAG, INJECTION (ML) INJECTION
Status: DISCONTINUED | OUTPATIENT
Start: 2023-07-04 | End: 2023-07-04

## 2023-07-04 RX ORDER — ONDANSETRON 8 MG/1
8 TABLET, ORALLY DISINTEGRATING ORAL EVERY 8 HOURS PRN
Status: DISCONTINUED | OUTPATIENT
Start: 2023-07-04 | End: 2023-07-06 | Stop reason: HOSPADM

## 2023-07-04 RX ORDER — SODIUM CHLORIDE 9 MG/ML
INJECTION, SOLUTION INTRAVENOUS CONTINUOUS
Status: CANCELLED | OUTPATIENT
Start: 2023-07-04

## 2023-07-04 RX ORDER — SIMETHICONE 80 MG
1 TABLET,CHEWABLE ORAL 4 TIMES DAILY PRN
Status: DISCONTINUED | OUTPATIENT
Start: 2023-07-04 | End: 2023-07-04 | Stop reason: SDUPTHER

## 2023-07-04 RX ORDER — DIPHENOXYLATE HYDROCHLORIDE AND ATROPINE SULFATE 2.5; .025 MG/1; MG/1
1 TABLET ORAL 4 TIMES DAILY PRN
Status: DISCONTINUED | OUTPATIENT
Start: 2023-07-04 | End: 2023-07-06 | Stop reason: HOSPADM

## 2023-07-04 RX ORDER — DIPHENHYDRAMINE HYDROCHLORIDE 50 MG/ML
25 INJECTION INTRAMUSCULAR; INTRAVENOUS EVERY 4 HOURS PRN
Status: DISCONTINUED | OUTPATIENT
Start: 2023-07-04 | End: 2023-07-06 | Stop reason: HOSPADM

## 2023-07-04 RX ORDER — ONDANSETRON 8 MG/1
8 TABLET, ORALLY DISINTEGRATING ORAL EVERY 8 HOURS PRN
Status: DISCONTINUED | OUTPATIENT
Start: 2023-07-04 | End: 2023-07-04

## 2023-07-04 RX ORDER — LIDOCAINE HYDROCHLORIDE AND EPINEPHRINE 15; 5 MG/ML; UG/ML
INJECTION, SOLUTION EPIDURAL
Status: DISCONTINUED | OUTPATIENT
Start: 2023-07-04 | End: 2023-07-04

## 2023-07-04 RX ORDER — SIMETHICONE 80 MG
1 TABLET,CHEWABLE ORAL EVERY 6 HOURS PRN
Status: DISCONTINUED | OUTPATIENT
Start: 2023-07-04 | End: 2023-07-06 | Stop reason: HOSPADM

## 2023-07-04 RX ADMIN — Medication 95 MILLI-UNITS/MIN: at 06:07

## 2023-07-04 RX ADMIN — LIDOCAINE HYDROCHLORIDE,EPINEPHRINE BITARTRATE 3 ML: 15; .005 INJECTION, SOLUTION EPIDURAL; INFILTRATION; INTRACAUDAL; PERINEURAL at 04:07

## 2023-07-04 RX ADMIN — Medication 2 ML: at 04:07

## 2023-07-04 RX ADMIN — Medication 334 MILLI-UNITS/MIN: at 06:07

## 2023-07-04 RX ADMIN — Medication 8 ML: at 04:07

## 2023-07-04 RX ADMIN — Medication 12 ML/HR: at 04:07

## 2023-07-04 NOTE — ANESTHESIA PROCEDURE NOTES
CSE    Patient location during procedure: OB  Start time: 7/4/2023 4:41 PM  Timeout: 7/4/2023 4:40 PM  End time: 7/4/2023 4:57 PM    Reason for block: labor analgesia requested by patient and obstetrician    Staffing  Authorizing Provider: Alberto Forbes MD  Performing Provider: Shamar Saenz Jr., CRNA    Preanesthetic Checklist  Completed: patient identified, IV checked, site marked, risks and benefits discussed, surgical consent, monitors and equipment checked, pre-op evaluation and timeout performed  CSE  Patient position: sitting  Prep: ChloraPrep  Patient monitoring: heart rate, continuous pulse ox and frequent blood pressure checks  Approach: midline  Spinal Needle  Needle type: pencil-tip   Needle gauge: 25 G  Needle length: 5 in  Epidural Needle  Injection technique: MOHINI air  Needle type: Tuohy   Needle gauge: 17 G  Needle length: 3.5 in  Needle insertion depth: 5.5 cm  Location: L3-4  Needle localization: anatomical landmarks   Catheter  Catheter type: springwound  Catheter size: 20 G  Catheter at skin depth: 12 cm  Test dose: lidocaine 1.5% with Epi 1-to-200,000  Test dose: 3 mL  Additional Documentation: incremental injection, negative aspiration for CSF, negative aspiration for heme, no paresthesia on injection and negative test dose  Assessment  Sensory level: T6   Dermatomal levels determined by pinch or prick  Additional Notes  Cse performed. Pt tolerated well. 2cc of bupiv with fent pulled from bag and given.

## 2023-07-04 NOTE — ANESTHESIA PREPROCEDURE EVALUATION
07/04/2023  Vilma Rao is a 23 y.o., female.    t 38w0d   Pre-op Assessment    I have reviewed the Patient Summary Reports.     I have reviewed the Nursing Notes.    I have reviewed the Medications.     Review of Systems  Anesthesia Hx:  No previous Anesthesia    Social:  Non-Smoker    Hematology/Oncology:  Hematology Normal   Oncology Normal     EENT/Dental:EENT/Dental Normal   Cardiovascular:   Exercise tolerance: good NYHA Classification I    Pulmonary:  Pulmonary Normal    Renal/:  Renal/ Normal     Hepatic/GI:  Hepatic/GI Normal    Musculoskeletal:  Musculoskeletal Normal    OB/GYN/PEDS:  Planned Vaginal Delivery Normal without problems, Hx of natural childbirth on previous vaginal deliveries.    Neurological:  Neurology Normal    Endocrine:  Endocrine Normal  Obesity / BMI > 30  Dermatological:  Skin Normal    Psych:  Psychiatric Normal           Physical Exam  General: Well nourished and Cooperative    Airway:  Mallampati: II   Mouth Opening: Normal  Tongue: Normal  Neck ROM: Normal ROM    Dental:  Intact    Chest/Lungs:  Clear to auscultation, Normal Respiratory Rate    Heart:  Rate: Normal  Rhythm: Regular Rhythm        Anesthesia Plan  Type of Anesthesia, risks & benefits discussed:    Anesthesia Type: Epidural, CSE  Intra-op Monitoring Plan: Standard ASA Monitors  Post Op Pain Control Plan: multimodal analgesia  Informed Consent: Informed consent signed with the Patient and all parties understand the risks and agree with anesthesia plan.  All questions answered. Patient consented to blood products? Yes  ASA Score: 2  Day of Surgery Review of History & Physical: I have interviewed and examined the patient. I have reviewed the patient's H&P dated: There are no significant changes.     Ready For Surgery From Anesthesia Perspective.     .

## 2023-07-04 NOTE — NURSING
"22yo  at 39w5d presents to L&D w/ c/o ctx since 9 this morning. Pt also states she has been leaking "maroon" fluid since 2 yesterday evening. EFM applied and VS taken. SVE /-2. Pt moved to labor room 359. Dr. Valles called and made aware. Anesthesia notified of pt requesting epidural.  ID#[id number].   "

## 2023-07-04 NOTE — L&D DELIVERY NOTE
[facility] - Labor & Delivery  Vaginal Delivery   Operative Note    SUMMARY     Normal spontaneous vaginal delivery of live male infant, was placed on mothers abdomen for skin to skin and bulb suctioning performed.  Infant delivered position OA over intact perineum.  Nuchal cord: No.    Spontaneous delivery of placenta and IV pitocin given noting good uterine tone.  2nd degree laceration noted and repaired in normal fashion with 2-0 chromic on CT .  Patient tolerated delivery well. Sponge needle and lap counted correctly x2.    Indications:  (spontaneous vaginal delivery)  Pregnancy complicated by:   Patient Active Problem List   Diagnosis     (spontaneous vaginal delivery)     Admitting GA: 39w5d    Delivery Information for Yury Rao    Birth information:  YOB: 2024   Time of birth: 5:55 PM   Sex: male   Head Delivery Date/Time: 2023  5:55 PM   Delivery type: Vaginal, Spontaneous   Gestational Age: 39w5d        Delivery Providers    Delivering clinician:            Measurements    Weight: 3680 g  Weight (lbs): 8 lb 1.8 oz  Length:          Apgars    Living status: Living  Apgars:  1 min.:  5 min.:  10 min.:  15 min.:  20 min.:    Skin color:  0  1       Heart rate:  2  2       Reflex irritability:  2  2       Muscle tone:  2  2       Respiratory effort:  2  2       Total:  8  9       Apgars assigned by: MICHAEL SALAZAR         Operative Delivery    Forceps attempted?: No  Vacuum extractor attempted?: No         Shoulder Dystocia    Shoulder dystocia present?: No           Presentation    Presentation: Vertex  Position: Occiput Anterior           Interventions/Resuscitation    Method: Bulb Suctioning, Tactile Stimulation       Cord    Vessels: 3 vessels  Complications: None  Delayed Cord Clamping?: No  Cord Blood Disposition: Sent with Baby  Gases Sent?: Yes  Stem Cell Collection (by MD): No       Placenta    Placenta delivery date/time: 2023 0600  Placenta removal:  Expressed  Placenta appearance: Intact  Placenta disposition: Discarded           Labor Events:       labor:       Labor Onset Date/Time:         Dilation Complete Date/Time:   17:00     Start Pushing Date/Time: 2023 17:46       Start Pushing Date/Time: 2023 17:46     Rupture Date/Time: 23  1000         Rupture type: SRM (Spontaneous Rupture)         Fluid Amount:       Fluid Color: Clear               steroids:       Antibiotics given for GBS:       Induction:       Indications for induction:        Augmentation:       Indications for augmentation:       Labor complications: None     Additional complications:          Cervical ripening:                     Delivery:      Episiotomy: None     Indication for Episiotomy:       Perineal Lacerations: 2nd Repaired:  Yes   Periurethral Laceration:   Repaired:     Labial Laceration:   Repaired:     Sulcus Laceration:   Repaired:     Vaginal Laceration:   Repaired:     Cervical Laceration:   Repaired:     Repair suture:       Repair # of packets: 1     Last Value - EBL - Nursing (mL):       Sum - EBL - Nursing (mL): 0     Last Value - EBL - Anesthesia (mL):      Calculated QBL (mL): 259      Vaginal Sweep Performed: Yes     Surgicount Correct: Yes     Vaginal Packing: No Quantity:       Other providers:       Anesthesia    Method: Spinal, Epidural          Details (if applicable):  Trial of Labor      Categorization:      Priority:     Indications for :     Incision Type:       Additional  information:  Forceps:    Vacuum:    Breech:    Observed anomalies    Other (Comments):

## 2023-07-05 LAB
ABO + RH BLD: NORMAL
BASOPHILS # BLD AUTO: 0.04 K/UL (ref 0–0.2)
BASOPHILS NFR BLD: 0.2 % (ref 0–1.9)
BLD GP AB SCN CELLS X3 SERPL QL: NORMAL
DIFFERENTIAL METHOD: ABNORMAL
EOSINOPHIL # BLD AUTO: 0.1 K/UL (ref 0–0.5)
EOSINOPHIL NFR BLD: 0.7 % (ref 0–8)
ERYTHROCYTE [DISTWIDTH] IN BLOOD BY AUTOMATED COUNT: 15.5 % (ref 11.5–14.5)
FETAL CELL SCN BLD QL ROSETTE: NORMAL
HCT VFR BLD AUTO: 35 % (ref 37–48.5)
HGB BLD-MCNC: 11.4 G/DL (ref 12–16)
IMM GRANULOCYTES # BLD AUTO: 0.07 K/UL (ref 0–0.04)
IMM GRANULOCYTES NFR BLD AUTO: 0.4 % (ref 0–0.5)
INJECT RH IG VOL PATIENT: NORMAL ML
LYMPHOCYTES # BLD AUTO: 4.5 K/UL (ref 1–4.8)
LYMPHOCYTES NFR BLD: 26.8 % (ref 18–48)
MCH RBC QN AUTO: 28.9 PG (ref 27–31)
MCHC RBC AUTO-ENTMCNC: 32.6 G/DL (ref 32–36)
MCV RBC AUTO: 89 FL (ref 82–98)
MONOCYTES # BLD AUTO: 1.3 K/UL (ref 0.3–1)
MONOCYTES NFR BLD: 7.5 % (ref 4–15)
NEUTROPHILS # BLD AUTO: 10.8 K/UL (ref 1.8–7.7)
NEUTROPHILS NFR BLD: 64.4 % (ref 38–73)
NRBC BLD-RTO: 0 /100 WBC
PLATELET # BLD AUTO: 231 K/UL (ref 150–450)
PMV BLD AUTO: 10.4 FL (ref 9.2–12.9)
RBC # BLD AUTO: 3.95 M/UL (ref 4–5.4)
RPR SER QL: NORMAL
WBC # BLD AUTO: 16.77 K/UL (ref 3.9–12.7)

## 2023-07-05 PROCEDURE — 85025 COMPLETE CBC W/AUTO DIFF WBC: CPT | Performed by: OBSTETRICS & GYNECOLOGY

## 2023-07-05 PROCEDURE — 11000001 HC ACUTE MED/SURG PRIVATE ROOM

## 2023-07-05 PROCEDURE — 85461 HEMOGLOBIN FETAL: CPT | Performed by: STUDENT IN AN ORGANIZED HEALTH CARE EDUCATION/TRAINING PROGRAM

## 2023-07-05 PROCEDURE — 36415 COLL VENOUS BLD VENIPUNCTURE: CPT | Performed by: STUDENT IN AN ORGANIZED HEALTH CARE EDUCATION/TRAINING PROGRAM

## 2023-07-05 PROCEDURE — 99231 SBSQ HOSP IP/OBS SF/LOW 25: CPT | Mod: ,,, | Performed by: STUDENT IN AN ORGANIZED HEALTH CARE EDUCATION/TRAINING PROGRAM

## 2023-07-05 PROCEDURE — 63600519 RHOGAM PHARM REV CODE 636 ALT 250 W HCPCS: Performed by: OBSTETRICS & GYNECOLOGY

## 2023-07-05 PROCEDURE — 86900 BLOOD TYPING SEROLOGIC ABO: CPT | Performed by: STUDENT IN AN ORGANIZED HEALTH CARE EDUCATION/TRAINING PROGRAM

## 2023-07-05 PROCEDURE — 99231 PR SUBSEQUENT HOSPITAL CARE,LEVL I: ICD-10-PCS | Mod: ,,, | Performed by: STUDENT IN AN ORGANIZED HEALTH CARE EDUCATION/TRAINING PROGRAM

## 2023-07-05 RX ADMIN — HUMAN RHO(D) IMMUNE GLOBULIN 300 MCG: 300 INJECTION, SOLUTION INTRAMUSCULAR at 09:07

## 2023-07-05 NOTE — PROGRESS NOTES
Postpartum Progress Note - Vaginal Delivery   #[id number] Julienne   Postpartum day 1    S: Patient feels well with no complaints. Pain is well-controlled with medications. Lochia decreasing. Urinating without difficulties. Tolerating a regular diet. Breastfeeding without difficulties.    Negative ROS: no headache, blurry vision, chest pain, SOB        O: Patient Vitals for the past 12 hrs:   BP Temp Temp src Pulse Resp SpO2   23 0721 (!) 97/54 97.6 °F (36.4 °C) -- 78 18 98 %   23 0300 123/65 98.2 °F (36.8 °C) Oral 84 18 --   23 2230 115/73 98 °F (36.7 °C) Oral 90 18 100 %   23 129/62 -- -- 82 -- --   23 (!) 110/56 -- -- 81 -- --   23 (!) 113/57 -- -- 85 -- --   23 (!) 109/58 -- -- 90 -- --       General - NAD   Lungs - good respiratory effort.   Abd - +BS. Soft, non-distended, appropriately tender to palpation. Uterus firm and below U.   Ext - no LE edema or calf tenderness bilaterally.       Lab Results   Component Value Date    HCT 35.0 (L) 2023         A/P:  23 y.o.  PPD1 s/p  at 39w5d   Patient stable. VSS.    1. Continue Routine Care   - Hgb 12.5 --> 11.4   - MBT: O NEG, Rhogam ordered   - Rubella non-immune, MMR ordered   - Breastfeeding. Appreciate assistance by lactation.   - Contraception: ring PP    - Discharge planned for PPD2        Arabella Potts MD

## 2023-07-05 NOTE — LACTATION NOTE
[facility]  Lactation Note - Mom    SUMMARY     Maternal Assessment    Breast Density: Bilateral:, soft      LATCH Score         Breasts WDL    Breast WDL: WDL    Maternal Infant Feeding    Maternal Preparation: breast care  Maternal Emotional State: relaxed  Pain with Feeding: no  Comfort Measures Following Feeding: air-drying encouraged  Latch Assistance: no    Lactation Referrals    Community Referrals: pediatric care provider  Pediatric Care Provider Lactation Follow-up Date/Time: within 2-3 days of d/c    Lactation Interventions    Breast Care: Breastfeeding: breast milk to nipples  Breastfeeding Assistance: feeding cue recognition promoted, feeding on demand promoted, hand expression verified, support offered  Breast Care: Breastfeeding: breast milk to nipples  Breastfeeding Assistance: feeding cue recognition promoted, feeding on demand promoted, hand expression verified, support offered  Breastfeeding Support: encouragement provided, lactation counseling provided, maternal hydration promoted, maternal nutrition promoted, maternal rest encouraged       Breastfeeding Session         Maternal Information

## 2023-07-05 NOTE — DISCHARGE INSTRUCTIONS
"Instrucciones Para Sari de Natalia    Instrucciones a Seguir    Dieta regular  Actividad: Aumentarntar gradualmente  Dick: Regadera o Leigh  Restricciones: No levantar nada pesado  Cuidado Personal: No tampones o duchas vaginales  Actividad Sexual: Kristen relaciones sexuales  Planificacion Familiar: Consulta con johnson medico  Cuidado de los Senos: Use un sosten de soporte  Regresar al trabajo/escuela: Cuando johnson medico le indique    Cuando debe llamar al Doctor    *Fiebre de 100.4 o mas alto  *Nausea/Vomito persistente  *Secrecion de la incision  *Fredi sangrado vaginal o coagulos  *Inflamacion/dolor en los brazos o las piernas  *Severo dolor de edy, vision borrosa or desmayos  *Frequencia/ardor urinario  *Senales de depresion post-parto        La Depresion Post-Parto    Usted acaba de tener un alberto'.  A pesar de que sabe que deberia sentirse emocionada y de la torre, lo que seinte son ganas de llorar sin motivo y tiene dificultades para realizar so tareas diarias.  Usted pasa la mayor parte del tiempo aldo, cansada y desesperanzada, y hasta podria sentirse avergonzada o culpable.  Andrei lo que le esta sucediendo no es culpa suya, y puede sentirse mejor.  Hable con johnson medico para que la ayude.     La Depresion Despues del Parto    Mustapha vez sienta cansancio y ganas de llorar adria despues de sari a bhaskar.  Esta etapa melancolica, denominada "baby blues", puede hacerla sentir aldo y desesperanzada, o temerosa de que algo pedro pablo le vaya a pasar al alberto.  Algunas mujeres hasta llegan a poner en rasheed el que puedan ser buenas madres.    Que' es la Depresion?    La depresion es un trastorno del animo que afecta johnson manera de pernsar y de sentir.  El sintoma mas frecuente es un sentimiento de honda tristeza: tambien podria causane la sensacion de que ya no puede sobrellevar la graham.    Otros sintomas incluyen:      * Ganar o perder mucho peso  * Dormir en exceso o demasiado poco  * Estar cansada todo el tiempo  * Sentirse inquieta  * " Tener miedo de querer herir al alberto'  * No tener interes por el alberto'  * Sentirse inutil o culpable   * No encontrar placer en las cosas que solia gustarle hacer  * Dificultades para pensar con claridad o tracie decisiones  * Pensar sobre la muerte o el suicidio     Que' Causa la Depresion Postparto?    La causea exacta de la depresion postarto se desconce, aunque posiblemente es el resultado de los cambios hormonales que suceden mark y despues del parto.  Tambien puede deberse al cansancio que le causan las exigencias del alberto' y el proceso de adaptacion a johnson maternidad.  Todos estos factores podrian deprimirla.  En algunos casos, existe letty predisposicion genetica a gretchen tipo de depresion.    La depresion puede tratarse    Lo brown es que hay muchas maneras de tratar la depresion postparo.  Emprenda el primer paso para sentirse mejor hablando con johnson medico.     Recursos    * National Institutes of Mental Health-- 415-936-3923     www.nimh.nih.gov    * National [place] on Mental Illness -- 968-462-2040     Www.sade.org    * Mental Health [place] --  [phone]     Www.[facility].org    * National Suidide Hotline -- 104.210.3416    3729-3653 [employer]  Todos los derechos reservados.  Esta informacion no pretende sustituir las atencion medica profesional.  Solo johnson medico puede diagnosticar y tratar un problema de nikki.       Instrucciones de la lactancia maternal para los bebes recién nacidos:   La Academia de Pediatra Americana recomienda la leche maternal jane la unica Diana de nutrición para johnson bebé mark los primeros 6 meses de la graham, y puede continuar, con la introducción de comida, hasta y despues de 1 ano de edad. Informado sobre anguiano consejos: Hay muchos beneficios de amamantar para el nikki de la madre y del bebé. Kristen los chupones, teteras, o botellas por lo menos por las primeras 4 semanas. Usar los chupones, teteras, o botellas pueden interumpir el proceso de amamantar.   Alimenta  en cuanto hay muestras de hambre:  Padmini en la boca, hacienda movimientos de succionar.  Ruiditos suavecitos o estirarse  Llorar es un signo de hambre tardío: no esperes a que el bebé llore.  Es de esperarse que haya 8-12 alimentaciones por 24 horas, aunque estas alimentaciones no sigan un horario definido.  Alterna el seno con el que empiezas, o empieza con el seno que se siente más lleno.  Cambia de lado cuando el bebé empiece a tragar más despacio o se desconecte del seno.  Esta fredi si el bebé no come del yahaira seno en cada alimentación.  Si el bebé esta muy dormido o somnoliente: el contacto de piel a piel puede animarlo a empezar a comer:  Quítale la camiseta y acuéstalo sobre tu pecho desnudo  Duerme cerca de tu bebé, aun en la casa. Aprende a sari pecho acostada.  En la posición correcta los dos estarán cómodos:  barbilla con seno, pecho con pecho  Labio del bebé abierto hacia afuera para tragar: el labio del bebé debe estar volteado hacia afuera  Boca abierta fredi fanny: la boca del bebé cubre la mayoría de la areola (el área oscura del seno)--no nada más el pezón  Fíjate en los signos de que hay transferencia de leche:  Puedes oír al bebé tragar.  No se oyen ruidos más o menos naveen jane clic.  El bebé no demuestra que tiene más hambre después de la alimentación.  El cuerpo del bebé y so padmini están relajados por un tiempo corto.  Lo que entra, tiene que salir. Está pendiente de:  Al cuarto día, por lo menos 3 cacas al día.  La rafa cambia de rafael a emeli o café y luego a amarillo más líquido cuando baja tu leche.  Después del cuarto día, por lo menos 6 pa?ales mojados/pesados al día.  La orina debe ser de color amarillo dk cuando baja tu leche.  Debes tracie agua cuando tienes sed y comer alimentos sanos cuando tiene hambre. Lilian siesta para descansar lo suficiente.   No tomes medicamentos o alcohol sino con el consejo de johnson doctor. Puedes llamar al [facility]Inf[facility]  [place]): ([phone]), Lunes a Viernes, 8am-5pm, para obtener información sobre los medicamentos y la leche maternal.  La jose de seguimiento con la pediatra debe ser 2 días después de salir del hospital. El bebé deberia judith ganado el peso de nacimiento cuando tiene 10-14 días de graham.

## 2023-07-05 NOTE — PLAN OF CARE
Note copied from Infant's chart (MRN: [id number])     SOCIAL WORK DISCHARGE PLANNING ASSESSMENT     Sw completed discharge planning assessment with pt's mother in mother's room K305 with assistance from  Artie [id number]. Pt's mother was easily engaged and education on the role of  was provided. Pt's mother reported all necessities for patient were obtained, including a car seat. Pt's mother reported she has good support from family and friends. Pt's father will provide transportation to family home following discharge. Pt's mother was provided with a list of pediatricians in the area that currently have openings. Pt's mother was encouraged to call with any questions or concerns. Pt's mother verbalized understanding.      Legal Name: Aamir Cruz        :  2024  Address: [address] [place] [address] [address] [place] [address]  Parent's Phone Numbers: pt's mother Vilma Blackburn Jalen [phone] and pt's father Almas Cruz [phone]     Pediatrician: Instructed to decide soon and inform RN                 Patient Active Problem List   Diagnosis    Term  delivered vaginally, current hospitalization     affected by maternal prolonged rupture of membranes            Birth [facility]           ARLENE: 22     Birth Weight:   3.68 kg (8 lb 1.8 oz)                Birth Length: 53.5cm               Gestational Age: 39w5d           Apgars    Living status: Living  Apgars:  1 min.:  5 min.:  10 min.:  15 min.:  20 min.:    Skin color:  0  1          Heart rate:  2  2          Reflex irritability:  2  2          Muscle tone:  2  2          Respiratory effort:  2  2          Total:  8  9          Apgars assigned by: MICHAEL SALAZAR           23 1407   OB Discharge Planning Assessment   Assessment Type Discharge Planning Assessment   Source of Information family   Verified Demographic and Insurance Information Yes   Insurance Medicaid   Medicaid [employer]   Spiritual  Affiliation Roman Catholic   Pastoral Care/Clergy/ Contact Status none needed   Father's Involvement Fully Involved   Is Father signing the birth certificate Yes   Father's Address [address] [place] [address]   Family Involvement Moderate   Primary Contact Name and Number pt's mother Vilma Blackburn [phone] and pt's father Almas Cruz [phone]   Received Prenatal Care Yes   Transportation Anticipated family or friend will provide   Receive [facility] and Home Benefits Already certified, will apply for new born    Arrangements Self;Family;Friends   Infant Feeding Plan breastfeeding;formula feeding   Breast Pump Needed no   Does baby have crib or safe sleep space? Yes   Do you have a car seat? Yes   Has other essential care items? Clothing;Bottles;Diapers   Pediatrician Sw provided pt's mother with a list of pediatricians in the area that currently have openings.   Resources/Education Provided Preparing for Your Baby's Discharge Home   DCFS No indications (Indicators for Report)   Discharge Plan A Home with family

## 2023-07-05 NOTE — PLAN OF CARE
Rounded on pt. Mom reports that baby has been BR well. Denies any problems or needs at this time. Discussed/encouraged exclusive BR. Mom will continue to exclusively breastfeed frequently & on cue at least 8+ times/24 hrs.  Will monitor for signs of deep latch & adequate fdg.  Will have baby's weight checked at ped's office in the next couple of days after d/c from hospital as recommended. Instructed to call for any questions/needs. Verbalized understanding.

## 2023-07-05 NOTE — ANESTHESIA POSTPROCEDURE EVALUATION
Anesthesia Post Evaluation    Patient: Vilma Rao    Procedure(s) Performed: * No procedures listed *    Final Anesthesia Type: CSE      Patient location during evaluation: labor & delivery  Patient participation: Yes- Able to Participate  Level of consciousness: awake and alert and oriented  Post-procedure vital signs: reviewed and stable  Pain management: adequate  Airway patency: patent    PONV status at discharge: No PONV  Anesthetic complications: no      Cardiovascular status: hemodynamically stable  Respiratory status: unassisted  Hydration status: euvolemic  Follow-up not needed.          Vitals Value Taken Time   /62 07/04/23 2040   Temp 36.6 °C (97.9 °F) 07/04/23 1700   Pulse 90 07/04/23 2057   Resp 19 07/04/23 2105   SpO2 100 % 07/04/23 1942   Vitals shown include unvalidated device data.      No case tracking events are documented in the log.      Pain/Rosamaria Score: No data recorded

## 2023-07-06 ENCOUNTER — TELEPHONE (OUTPATIENT)
Dept: OBSTETRICS AND GYNECOLOGY | Facility: CLINIC | Age: 23
End: 2023-07-06
Payer: MEDICAID

## 2023-07-06 VITALS
BODY MASS INDEX: 36.37 KG/M2 | WEIGHT: 213 LBS | OXYGEN SATURATION: 100 % | DIASTOLIC BLOOD PRESSURE: 76 MMHG | SYSTOLIC BLOOD PRESSURE: 117 MMHG | HEART RATE: 73 BPM | TEMPERATURE: 98 F | HEIGHT: 64 IN | RESPIRATION RATE: 18 BRPM

## 2023-07-06 PROCEDURE — 99238 PR HOSPITAL DISCHARGE DAY,<30 MIN: ICD-10-PCS | Mod: ,,, | Performed by: STUDENT IN AN ORGANIZED HEALTH CARE EDUCATION/TRAINING PROGRAM

## 2023-07-06 PROCEDURE — 99238 HOSP IP/OBS DSCHRG MGMT 30/<: CPT | Mod: ,,, | Performed by: STUDENT IN AN ORGANIZED HEALTH CARE EDUCATION/TRAINING PROGRAM

## 2023-07-06 RX ORDER — DOCUSATE SODIUM 100 MG/1
200 CAPSULE, LIQUID FILLED ORAL 2 TIMES DAILY PRN
Qty: 60 CAPSULE | Refills: 1 | Status: SHIPPED | OUTPATIENT
Start: 2023-07-06

## 2023-07-06 RX ORDER — IBUPROFEN 600 MG/1
600 TABLET ORAL EVERY 6 HOURS PRN
Qty: 60 TABLET | Refills: 1 | Status: SHIPPED | OUTPATIENT
Start: 2023-07-06

## 2023-07-06 NOTE — PLAN OF CARE
Patient ambulating freely in room. POC reviewed with pt; able to verbalize acceptance and understanding. Tolerating regular diet. Voiding spontaneously.  VSS. Call light in reach. NAD noted. Will continue to monitor.

## 2023-07-06 NOTE — TELEPHONE ENCOUNTER
----- Message from Arabella Potts MD sent at 7/6/2023  8:14 AM CDT -----  Regarding: schedule PP visit  Please schedule patient for 6 week PP visit thanks!

## 2023-07-06 NOTE — LACTATION NOTE
This note was copied from a baby's chart.  Mother requests formula for infant. Information provided on benefits of exclusive breastfeeding, supply and demand, adequacy of colostrum, feeding frequency and normal  feeding patterns. Informed about risks of formula feeding, nipple confusion, and decreased milk supply. After education, mother still chooses to formula feed.  .      Safe formula feeding handout given and reviewed.  Discussed proper hand washing, expiration time of formula, position of baby, position of nipple and bottle while feeding, baby led paced feeding and fullness cues.  Pt verbalized understanding and verbalized appropriate recall.     Educated mother on alternative feeding methods. Mother refuses syringe or spoon at this time and requests a nipple.

## 2023-07-06 NOTE — DISCHARGE SUMMARY
"Obstetrical Discharge Summary    Admission Date: 2023   Discharge Date: 2023  Admitting Diagnosis: Abdominal pain affecting pregnancy [O26.899, R10.9] Intrauterine pregnancy 39w5d  Discharge Diagnosis: Vaginal, Spontaneous     Procedure:   Consults: none     Delivery date/time: 2023  @ 5:55 pm   Delivery type: Vaginal, Spontaneous   Delivery Anesthesia: Spinal;Epidural   Episiotomy:Episiotomy: None   Laceration type: 2nd     Infant sex: male   Infant birthweight: 3.68 kg (8 lb 1.8 oz)     Apgars:  1 minute: 8    5 minute: 9    10 minute:    15 minute:                       Hospital Course: Patient was admitted to L&D for labor. She had a uncomplicated vaginal delivery of a viable male infant weighing 3.68 kg (8 lb 1.8 oz) .  She was transferred to mother baby in stable condition.  Her recovery was uncomplicated and by post-partum day 2 she was tolerating PO without N/V, ambulating without difficulty, her pain was well controlled with PO pain medications and she had passed flatus. She was breastfeeding. She desired vaginal ring at 6 weeks PP for birth control. She was stable and ready for discharge.       Pertinent studies:  Postpartum CBC  Lab Results   Component Value Date    WBC 16.77 (H) 2023    HGB 11.4 (L) 2023    HCT 35.0 (L) 2023    MCV 89 2023     2023       Vital signs at discharge:  /76   Pulse 73   Temp 98 °F (36.7 °C)   Resp 18   Ht 5' 4" (1.626 m)   Wt 96.6 kg (213 lb)   LMP 10/17/2022 (Exact Date)   SpO2 100%   Breastfeeding Yes   BMI 36.56 kg/m²     Rh Immune Globulin Given(O NEG): 23  Rubella Vaccine Given: N/A   Tdap Vaccine Given: 23  Contraception: NuvaRing      Patient Instructions:   Discharge Medication List as of 2023  8:21 AM        START taking these medications    Details   docusate sodium (COLACE) 100 MG capsule Take 2 capsules (200 mg total) by mouth 2 (two) times daily as needed for Constipation., Starting " Thu 7/6/2023, Normal      ibuprofen (ADVIL,MOTRIN) 600 MG tablet Take 1 tablet (600 mg total) by mouth every 6 (six) hours as needed (cramping)., Starting Thu 7/6/2023, Normal           STOP taking these medications       azithromycin (ZITHROMAX) 500 MG tablet Comments:   Reason for Stopping:         prenatal 168/iron/folic/omega3 (ONE-A-DAY PRENATAL-1 ORAL) Comments:   Reason for Stopping:               Discharge Procedure Orders   BREAST PUMP FOR HOME USE     Order Specific Question Answer Comments   Type of pump: Electric    Weight: 96.6 kg (213 lb)    Length of need (1-99 months): 99      Pelvic Rest     Notify your health care provider if you experience any of the following:  temperature >100.4     Notify your health care provider if you experience any of the following:  persistent nausea and vomiting or diarrhea     Notify your health care provider if you experience any of the following:  severe uncontrolled pain     Notify your health care provider if you experience any of the following:  persistent dizziness, light-headedness, or visual disturbances     Notify your health care provider if you experience any of the following:  severe persistent headache     Activity as tolerated           Disposition: home   Condition: stable for discharge  Follow up: 6 week post partum visit      Preprinted discharge instructions given to patient.            Arabella Potts MD

## 2023-07-06 NOTE — NURSING
Mother remains in room with . [place] 48 hr hold due to length of SROM greater than 30 hours.

## 2023-07-06 NOTE — LACTATION NOTE
[facility]  Lactation Note - Mom    SUMMARY     Maternal Assessment    Breast Density: Bilateral:, soft (per pt)  Nipples: Bilateral:, graspable (per pt)  Left Nipple Symptoms:  (denies pain)  Right Nipple Symptoms:  (denies pain)      LATCH Score         Breasts WDL    Breast WDL: WDL  Left Nipple Symptoms:  (denies pain)  Right Nipple Symptoms:  (denies pain)    Maternal Infant Feeding    Maternal Preparation: breast care, hand hygiene  Maternal Emotional State: independent, relaxed  Pain with Feeding: no  Comfort Measures Following Feeding: air-drying encouraged  Latch Assistance:  (offered, enc to call PRN)    Lactation Referrals    Community Referrals: outpatient lactation program, pediatric care provider  Outpatient Lactation Program Lactation Follow-up Date/Time: call lact ctr PRN  Pediatric Care Provider Lactation Follow-up Date/Time: f/u for weight check    Lactation Interventions    Breast Care: Breastfeeding: warm shower encouraged  Breastfeeding Assistance: support offered, feeding on demand promoted, feeding cue recognition promoted  Breast Care: Breastfeeding: warm shower encouraged  Breastfeeding Assistance: support offered, feeding on demand promoted, feeding cue recognition promoted  Breastfeeding Support: encouragement provided, maternal rest encouraged, maternal nutrition promoted, maternal hydration promoted       Breastfeeding Session         Maternal Information

## 2023-07-06 NOTE — PROGRESS NOTES
Postpartum Progress Note - Vaginal Delivery   #[id number]  Jory   Postpartum day 1    S: Patient feels well with no complaints. Pain is well-controlled with medications. Lochia decreasing. Urinating without difficulties. Tolerating a regular diet. Breastfeeding without difficulties.    Negative ROS: no headache, blurry vision, chest pain, SOB        O: Patient Vitals for the past 12 hrs:   BP Temp Temp src Pulse Resp SpO2   23 0738 117/76 98 °F (36.7 °C) -- 73 18 100 %   23 0154 113/69 97.7 °F (36.5 °C) Oral 71 17 98 %       General - NAD   Lungs - good respiratory effort.   Abd - +BS. Soft, non-distended, appropriately tender to palpation. Uterus firm and below U.   Ext - no LE edema or calf tenderness bilaterally.       Lab Results   Component Value Date    HCT 35.0 (L) 2023         A/P:  23 y.o.  PPD2 s/p  at 39w5d   Patient stable. VSS.    1. Continue Routine Care   - Hgb 12.5 --> 11.4   - MBT: O NEG, Rhogam ordered   - Rubella non-immune, MMR ordered   - Breastfeeding. Appreciate assistance by lactation.   - Contraception: ring PP    - Discharge planned for today         Arabella Potts MD

## 2023-08-01 NOTE — H&P
Late entry   CC: contractions    HPI:   Vilma Rao is a 23 y.o. female  at 39 5/7 EGA who presents here for contractions    ROS:  Negative     History reviewed. No pertinent past medical history.  History reviewed. No pertinent surgical history.  History reviewed. No pertinent family history.  Allergies: Patient has no known allergies.  Social History     Socioeconomic History    Marital status: Single   Tobacco Use    Smoking status: Never    Smokeless tobacco: Never   Substance and Sexual Activity    Alcohol use: Not Currently    Drug use: Never    Sexual activity: Yes     Partners: Male   Meds: pnv        PE:        APPEARANCE: Well nourished, well developed, in no acute distress.  CHEST: Lungs clear to auscultation.  HEART: Regular rate and rhythm, no murmurs, rubs or gallops.  ABDOMEN:  Gravid.   SVE: 8 cm per RN   /mod juan/+accel -decel   [place]: Q3-5 mintues     Assessment:  IUP 39 5/7 weeks  Labor: admit  Rh negative: rhogam after delivery if needed  Anemia   Incomplete anatomy

## 2024-11-05 ENCOUNTER — TELEPHONE (OUTPATIENT)
Dept: OBSTETRICS AND GYNECOLOGY | Facility: CLINIC | Age: 24
End: 2024-11-05
Payer: MEDICAID

## 2024-11-05 NOTE — TELEPHONE ENCOUNTER
I spoke with Meliza a representative from formerly [facility] regarding  Ms. Esparza referral to see  for a future visit. No appt was scheduled. Per Meliza she will call Ms. Esparza herself to discuss the referral. I gave her the fax number to the clinic so she can fax over the paperwork.

## 2024-12-03 ENCOUNTER — LAB VISIT (OUTPATIENT)
Dept: LAB | Facility: HOSPITAL | Age: 24
End: 2024-12-03
Attending: OBSTETRICS & GYNECOLOGY
Payer: MEDICAID

## 2024-12-03 ENCOUNTER — OFFICE VISIT (OUTPATIENT)
Dept: OBSTETRICS AND GYNECOLOGY | Facility: CLINIC | Age: 24
End: 2024-12-03
Payer: MEDICAID

## 2024-12-03 ENCOUNTER — PROCEDURE VISIT (OUTPATIENT)
Dept: MATERNAL FETAL MEDICINE | Facility: CLINIC | Age: 24
End: 2024-12-03
Payer: MEDICAID

## 2024-12-03 VITALS
SYSTOLIC BLOOD PRESSURE: 126 MMHG | WEIGHT: 192.44 LBS | BODY MASS INDEX: 33.04 KG/M2 | DIASTOLIC BLOOD PRESSURE: 77 MMHG

## 2024-12-03 DIAGNOSIS — N91.2 AMENORRHEA: Primary | ICD-10-CM

## 2024-12-03 DIAGNOSIS — N91.2 AMENORRHEA: ICD-10-CM

## 2024-12-03 LAB
ABO + RH BLD: NORMAL
BASOPHILS # BLD AUTO: 0.02 K/UL (ref 0–0.2)
BASOPHILS NFR BLD: 0.2 % (ref 0–1.9)
BLD GP AB SCN CELLS X3 SERPL QL: NORMAL
DIFFERENTIAL METHOD BLD: ABNORMAL
EOSINOPHIL # BLD AUTO: 0.1 K/UL (ref 0–0.5)
EOSINOPHIL NFR BLD: 0.5 % (ref 0–8)
ERYTHROCYTE [DISTWIDTH] IN BLOOD BY AUTOMATED COUNT: 12.7 % (ref 11.5–14.5)
HBV SURFACE AG SERPL QL IA: NORMAL
HCT VFR BLD AUTO: 34 % (ref 37–48.5)
HCV AB SERPL QL IA: NORMAL
HGB BLD-MCNC: 11.7 G/DL (ref 12–16)
HIV 1+2 AB+HIV1 P24 AG SERPL QL IA: NORMAL
IMM GRANULOCYTES # BLD AUTO: 0.04 K/UL (ref 0–0.04)
IMM GRANULOCYTES NFR BLD AUTO: 0.4 % (ref 0–0.5)
LYMPHOCYTES # BLD AUTO: 2 K/UL (ref 1–4.8)
LYMPHOCYTES NFR BLD: 18.5 % (ref 18–48)
MCH RBC QN AUTO: 29.8 PG (ref 27–31)
MCHC RBC AUTO-ENTMCNC: 34.4 G/DL (ref 32–36)
MCV RBC AUTO: 87 FL (ref 82–98)
MONOCYTES # BLD AUTO: 0.4 K/UL (ref 0.3–1)
MONOCYTES NFR BLD: 3.9 % (ref 4–15)
NEUTROPHILS # BLD AUTO: 8.1 K/UL (ref 1.8–7.7)
NEUTROPHILS NFR BLD: 76.5 % (ref 38–73)
NRBC BLD-RTO: 0 /100 WBC
PLATELET # BLD AUTO: 237 K/UL (ref 150–450)
PMV BLD AUTO: 10.1 FL (ref 9.2–12.9)
RBC # BLD AUTO: 3.93 M/UL (ref 4–5.4)
WBC # BLD AUTO: 10.6 K/UL (ref 3.9–12.7)
WEAK D AG RBC QL: NORMAL

## 2024-12-03 PROCEDURE — 86593 SYPHILIS TEST NON-TREP QUANT: CPT | Performed by: OBSTETRICS & GYNECOLOGY

## 2024-12-03 PROCEDURE — 86900 BLOOD TYPING SEROLOGIC ABO: CPT | Performed by: OBSTETRICS & GYNECOLOGY

## 2024-12-03 PROCEDURE — 87389 HIV-1 AG W/HIV-1&-2 AB AG IA: CPT | Performed by: OBSTETRICS & GYNECOLOGY

## 2024-12-03 PROCEDURE — 87491 CHLMYD TRACH DNA AMP PROBE: CPT | Performed by: OBSTETRICS & GYNECOLOGY

## 2024-12-03 PROCEDURE — 86803 HEPATITIS C AB TEST: CPT | Performed by: OBSTETRICS & GYNECOLOGY

## 2024-12-03 PROCEDURE — 87340 HEPATITIS B SURFACE AG IA: CPT | Performed by: OBSTETRICS & GYNECOLOGY

## 2024-12-03 PROCEDURE — 99213 OFFICE O/P EST LOW 20 MIN: CPT | Mod: PBBFAC,PO,25 | Performed by: OBSTETRICS & GYNECOLOGY

## 2024-12-03 PROCEDURE — 76805 OB US >/= 14 WKS SNGL FETUS: CPT | Mod: PBBFAC,PO | Performed by: OBSTETRICS & GYNECOLOGY

## 2024-12-03 PROCEDURE — 99999 PR PBB SHADOW E&M-EST. PATIENT-LVL III: CPT | Mod: PBBFAC,,, | Performed by: OBSTETRICS & GYNECOLOGY

## 2024-12-03 PROCEDURE — 87086 URINE CULTURE/COLONY COUNT: CPT | Performed by: OBSTETRICS & GYNECOLOGY

## 2024-12-03 PROCEDURE — 86762 RUBELLA ANTIBODY: CPT | Performed by: OBSTETRICS & GYNECOLOGY

## 2024-12-03 PROCEDURE — 85025 COMPLETE CBC W/AUTO DIFF WBC: CPT | Performed by: OBSTETRICS & GYNECOLOGY

## 2024-12-03 NOTE — PROGRESS NOTES
25 yo  female with amenorrhea who presents to confirm her pregnancy    AP: 25 yo  female @ 17.2 wks by 17.2 wk sono EDC 2025 who presents for OB care.    1) SIUP  -s/p official dating scan  -new OB labs today  -anatomy ordered  -mat 21 ordered    2) Rh neg    F/u in 4 wks OB visit      SMILEY tejeda MD

## 2024-12-04 LAB
RUBV IGG SER-ACNC: <5 IU/ML
RUBV IGG SER-IMP: ABNORMAL
TREPONEMA PALLIDUM IGG+IGM AB [PRESENCE] IN SERUM OR PLASMA BY IMMUNOASSAY: NONREACTIVE

## 2024-12-05 LAB
BACTERIA UR CULT: NORMAL
BACTERIA UR CULT: NORMAL

## 2024-12-06 LAB
C TRACH DNA SPEC QL NAA+PROBE: NOT DETECTED
N GONORRHOEA DNA SPEC QL NAA+PROBE: NOT DETECTED

## 2024-12-11 LAB
ABOUT THE TEST: NORMAL
APPROVED BY: NORMAL
FETAL FRACTION: NORMAL
FETAL SEX RESULT: NORMAL
GESTATIONAL AGE > 9: YES
GESTATIONAL AGE: NORMAL
LAB DIRECTOR COMMENTS: NORMAL
LIMITATIONS:: NORMAL
MONOSOMY X RESULT: NOT DETECTED
NEGATIVE PREDICTIVE VALUE: NORMAL
NOTE: NORMAL
PERFORMANCE CHARACTERISTICS: NORMAL
PERFORMANCE: NORMAL
POSITIVE PREDICTIVE VALUE: NORMAL
RESULT: NEGATIVE
SERVICE CMNT 04-IMP: NORMAL
TEST METHODOLOGY:: NORMAL
TRISOMY 13 (T13): NEGATIVE
TRISOMY 18: NEGATIVE
TRISOMY 21 (T21): NEGATIVE
XXX (TRIPLE X SYNDROME): NOT DETECTED
XXY (KLINEFELTER SYNDROME): NOT DETECTED
XYY (JACOBS SYNDROME): NOT DETECTED

## 2024-12-23 ENCOUNTER — PROCEDURE VISIT (OUTPATIENT)
Dept: MATERNAL FETAL MEDICINE | Facility: CLINIC | Age: 24
End: 2024-12-23
Payer: MEDICAID

## 2024-12-23 DIAGNOSIS — N91.2 AMENORRHEA: ICD-10-CM

## 2025-01-03 ENCOUNTER — ROUTINE PRENATAL (OUTPATIENT)
Dept: OBSTETRICS AND GYNECOLOGY | Facility: CLINIC | Age: 25
End: 2025-01-03
Payer: MEDICAID

## 2025-01-03 VITALS
WEIGHT: 194.25 LBS | BODY MASS INDEX: 33.34 KG/M2 | DIASTOLIC BLOOD PRESSURE: 71 MMHG | SYSTOLIC BLOOD PRESSURE: 109 MMHG

## 2025-01-03 DIAGNOSIS — Z3A.21 21 WEEKS GESTATION OF PREGNANCY: Primary | ICD-10-CM

## 2025-01-03 PROCEDURE — 99999 PR PBB SHADOW E&M-EST. PATIENT-LVL II: CPT | Mod: PBBFAC,,, | Performed by: OBSTETRICS & GYNECOLOGY

## 2025-01-03 PROCEDURE — 99212 OFFICE O/P EST SF 10 MIN: CPT | Mod: PBBFAC,PO | Performed by: OBSTETRICS & GYNECOLOGY

## 2025-01-03 NOTE — PROGRESS NOTES
Good fetal movement. No complaints today.    AP: 23 yo  female @ 21.5 wks by 17.2 wk sono EDC 2025 who presents for OB care.    1) SIUP (it's a girl)  -suboptimal anatomy   --anatomy ordered  -mat 21 neg  -consents for vag/blood signed 1/3/2025    2) Rh neg    3) PP  RNI   Thinking about BTL    F/u in 4 wks OB visit f/u anatomy and 1 hr gtt      S MD ileana

## 2025-01-19 ENCOUNTER — PATIENT MESSAGE (OUTPATIENT)
Dept: OBSTETRICS AND GYNECOLOGY | Facility: CLINIC | Age: 25
End: 2025-01-19
Payer: MEDICAID

## 2025-01-19 ENCOUNTER — PATIENT MESSAGE (OUTPATIENT)
Dept: OTHER | Facility: OTHER | Age: 25
End: 2025-01-19
Payer: MEDICAID

## 2025-01-31 ENCOUNTER — PROCEDURE VISIT (OUTPATIENT)
Dept: MATERNAL FETAL MEDICINE | Facility: CLINIC | Age: 25
End: 2025-01-31
Payer: MEDICAID

## 2025-01-31 ENCOUNTER — ROUTINE PRENATAL (OUTPATIENT)
Dept: OBSTETRICS AND GYNECOLOGY | Facility: CLINIC | Age: 25
End: 2025-01-31
Payer: MEDICAID

## 2025-01-31 VITALS
SYSTOLIC BLOOD PRESSURE: 104 MMHG | WEIGHT: 197.56 LBS | DIASTOLIC BLOOD PRESSURE: 71 MMHG | BODY MASS INDEX: 33.91 KG/M2

## 2025-01-31 DIAGNOSIS — N91.2 AMENORRHEA: Primary | ICD-10-CM

## 2025-01-31 DIAGNOSIS — Z3A.25 25 WEEKS GESTATION OF PREGNANCY: Primary | ICD-10-CM

## 2025-01-31 DIAGNOSIS — N91.2 AMENORRHEA: ICD-10-CM

## 2025-01-31 LAB
BILIRUB SERPL-MCNC: NORMAL MG/DL
BLOOD URINE, POC: NORMAL
CLARITY, POC UA: CLEAR
COLOR, POC UA: YELLOW
GLUCOSE UR QL STRIP: NORMAL
KETONES UR QL STRIP: NORMAL
LEUKOCYTE ESTERASE URINE, POC: NORMAL
NITRITE, POC UA: NORMAL
PH, POC UA: 7
PROTEIN, POC: NORMAL
SPECIFIC GRAVITY, POC UA: 1.02
UROBILINOGEN, POC UA: 0.2

## 2025-01-31 PROCEDURE — 99213 OFFICE O/P EST LOW 20 MIN: CPT | Mod: TH,S$PBB,, | Performed by: OBSTETRICS & GYNECOLOGY

## 2025-01-31 PROCEDURE — 99999 PR PBB SHADOW E&M-EST. PATIENT-LVL II: CPT | Mod: PBBFAC,,, | Performed by: OBSTETRICS & GYNECOLOGY

## 2025-01-31 PROCEDURE — 99212 OFFICE O/P EST SF 10 MIN: CPT | Mod: PBBFAC,PO | Performed by: OBSTETRICS & GYNECOLOGY

## 2025-01-31 PROCEDURE — 76816 OB US FOLLOW-UP PER FETUS: CPT | Mod: PBBFAC,PO | Performed by: OBSTETRICS & GYNECOLOGY

## 2025-01-31 PROCEDURE — 99999PBSHW POCT URINE DIPSTICK WITHOUT MICROSCOPE: Mod: PBBFAC,,,

## 2025-01-31 PROCEDURE — 81002 URINALYSIS NONAUTO W/O SCOPE: CPT | Mod: PBBFAC,PO | Performed by: OBSTETRICS & GYNECOLOGY

## 2025-01-31 NOTE — PROGRESS NOTES
Good fetal movement. No complaints today.  Growth US and f/u anatomy pending for 2025    AP: 23 yo  female @ 25.5 wks by 17.2 wk sono EDC 2025 who presents for OB care.    1) SIUP (it's a girl)  -suboptimal anatomy   --anatomy ordered  -mat 21 neg  -consents for vag/blood signed 1/3/2025  -1 hr gtt ordered    2) Rh neg (needs rhogam at next visit)    3) PP  RNI   Thinking about BTL  Provided with list of other contraception options    F/u in 3 wks      SMILEY tejeda MD

## 2025-02-03 ENCOUNTER — LAB VISIT (OUTPATIENT)
Dept: LAB | Facility: HOSPITAL | Age: 25
End: 2025-02-03
Attending: OBSTETRICS & GYNECOLOGY
Payer: MEDICAID

## 2025-02-03 DIAGNOSIS — Z3A.25 25 WEEKS GESTATION OF PREGNANCY: ICD-10-CM

## 2025-02-03 LAB
BASOPHILS # BLD AUTO: 0.03 K/UL (ref 0–0.2)
BASOPHILS NFR BLD: 0.2 % (ref 0–1.9)
DIFFERENTIAL METHOD BLD: ABNORMAL
EOSINOPHIL # BLD AUTO: 0.1 K/UL (ref 0–0.5)
EOSINOPHIL NFR BLD: 1 % (ref 0–8)
ERYTHROCYTE [DISTWIDTH] IN BLOOD BY AUTOMATED COUNT: 13.5 % (ref 11.5–14.5)
GLUCOSE SERPL-MCNC: 89 MG/DL (ref 70–140)
HCT VFR BLD AUTO: 33.5 % (ref 37–48.5)
HGB BLD-MCNC: 11 G/DL (ref 12–16)
IMM GRANULOCYTES # BLD AUTO: 0.07 K/UL (ref 0–0.04)
IMM GRANULOCYTES NFR BLD AUTO: 0.5 % (ref 0–0.5)
LYMPHOCYTES # BLD AUTO: 3.1 K/UL (ref 1–4.8)
LYMPHOCYTES NFR BLD: 22.9 % (ref 18–48)
MCH RBC QN AUTO: 28.9 PG (ref 27–31)
MCHC RBC AUTO-ENTMCNC: 32.8 G/DL (ref 32–36)
MCV RBC AUTO: 88 FL (ref 82–98)
MONOCYTES # BLD AUTO: 0.7 K/UL (ref 0.3–1)
MONOCYTES NFR BLD: 5.4 % (ref 4–15)
NEUTROPHILS # BLD AUTO: 9.4 K/UL (ref 1.8–7.7)
NEUTROPHILS NFR BLD: 70 % (ref 38–73)
NRBC BLD-RTO: 0 /100 WBC
PLATELET # BLD AUTO: 250 K/UL (ref 150–450)
PMV BLD AUTO: 10.3 FL (ref 9.2–12.9)
RBC # BLD AUTO: 3.8 M/UL (ref 4–5.4)
WBC # BLD AUTO: 13.45 K/UL (ref 3.9–12.7)

## 2025-02-03 PROCEDURE — 36415 COLL VENOUS BLD VENIPUNCTURE: CPT | Performed by: OBSTETRICS & GYNECOLOGY

## 2025-02-03 PROCEDURE — 82950 GLUCOSE TEST: CPT | Performed by: OBSTETRICS & GYNECOLOGY

## 2025-02-03 PROCEDURE — 85025 COMPLETE CBC W/AUTO DIFF WBC: CPT | Performed by: OBSTETRICS & GYNECOLOGY

## 2025-02-16 ENCOUNTER — PATIENT MESSAGE (OUTPATIENT)
Dept: OTHER | Facility: OTHER | Age: 25
End: 2025-02-16
Payer: MEDICAID

## 2025-02-24 ENCOUNTER — ROUTINE PRENATAL (OUTPATIENT)
Dept: OBSTETRICS AND GYNECOLOGY | Facility: CLINIC | Age: 25
End: 2025-02-24
Payer: MEDICAID

## 2025-02-24 ENCOUNTER — CLINICAL SUPPORT (OUTPATIENT)
Dept: OBSTETRICS AND GYNECOLOGY | Facility: CLINIC | Age: 25
End: 2025-02-24
Payer: MEDICAID

## 2025-02-24 VITALS
BODY MASS INDEX: 34.55 KG/M2 | DIASTOLIC BLOOD PRESSURE: 74 MMHG | WEIGHT: 201.25 LBS | SYSTOLIC BLOOD PRESSURE: 112 MMHG

## 2025-02-24 DIAGNOSIS — Z29.13 NEED FOR RHOGAM DUE TO RH NEGATIVE MOTHER: Primary | ICD-10-CM

## 2025-02-24 DIAGNOSIS — Z3A.29 29 WEEKS GESTATION OF PREGNANCY: Primary | ICD-10-CM

## 2025-02-24 DIAGNOSIS — Z34.83 ENCOUNTER FOR SUPERVISION OF OTHER NORMAL PREGNANCY IN THIRD TRIMESTER: ICD-10-CM

## 2025-02-24 DIAGNOSIS — Z30.017 ENCOUNTER FOR INITIAL PRESCRIPTION OF IMPLANTABLE SUBDERMAL CONTRACEPTIVE: ICD-10-CM

## 2025-02-24 PROCEDURE — 96372 THER/PROPH/DIAG INJ SC/IM: CPT | Mod: PBBFAC,PO

## 2025-02-24 PROCEDURE — 99999PBSHW PR PBB SHADOW TECHNICAL ONLY FILED TO HB: Mod: PBBFAC,,,

## 2025-02-24 PROCEDURE — 99999 PR PBB SHADOW E&M-EST. PATIENT-LVL II: CPT | Mod: PBBFAC,,, | Performed by: OBSTETRICS & GYNECOLOGY

## 2025-02-24 PROCEDURE — 99212 OFFICE O/P EST SF 10 MIN: CPT | Mod: PBBFAC,TH,PO | Performed by: OBSTETRICS & GYNECOLOGY

## 2025-02-24 PROCEDURE — 99214 OFFICE O/P EST MOD 30 MIN: CPT | Mod: TH,S$PBB,, | Performed by: OBSTETRICS & GYNECOLOGY

## 2025-02-24 RX ADMIN — HUMAN RHO(D) IMMUNE GLOBULIN 300 MCG: 300 INJECTION, SOLUTION INTRAMUSCULAR at 10:02

## 2025-02-24 NOTE — PROGRESS NOTES
Rhogam Administered IM to LUOQ. Tolerated well, no redness or swelling. Patient instructed to stay in office for 15 minutes in case of reaction. Patient verbalizes understanding.Patient instructed to card Rhogam administration card.

## 2025-02-24 NOTE — PROGRESS NOTES
Good fetal movement. No complaints today.    AP: 25 yo  female @ 29.1 wks by 17.2 wk sono EDC 2025 who presents for OB care.    1) SIUP (it's a girl)  --anatomy ok  -mat 21 neg  -consents for vag/blood signed 1/3/2025  -1 hr gtt wnl    2) Rh neg: s/p rhogam on 2025    3) PP  RNI   Contraception: wants Nexplanon (ordered 2025)  Ok with vaccines at next visit    F/u in 3 wks, growth US ordered      SMILEY tejeda MD

## 2025-03-02 ENCOUNTER — PATIENT MESSAGE (OUTPATIENT)
Dept: OTHER | Facility: OTHER | Age: 25
End: 2025-03-02
Payer: MEDICAID

## 2025-03-16 ENCOUNTER — PATIENT MESSAGE (OUTPATIENT)
Dept: OTHER | Facility: OTHER | Age: 25
End: 2025-03-16
Payer: MEDICAID

## 2025-03-18 ENCOUNTER — PROCEDURE VISIT (OUTPATIENT)
Dept: MATERNAL FETAL MEDICINE | Facility: CLINIC | Age: 25
End: 2025-03-18
Payer: MEDICAID

## 2025-03-18 ENCOUNTER — ROUTINE PRENATAL (OUTPATIENT)
Dept: OBSTETRICS AND GYNECOLOGY | Facility: CLINIC | Age: 25
End: 2025-03-18
Payer: MEDICAID

## 2025-03-18 VITALS
WEIGHT: 202.63 LBS | DIASTOLIC BLOOD PRESSURE: 75 MMHG | SYSTOLIC BLOOD PRESSURE: 115 MMHG | BODY MASS INDEX: 34.78 KG/M2

## 2025-03-18 DIAGNOSIS — Z3A.32 32 WEEKS GESTATION OF PREGNANCY: Primary | ICD-10-CM

## 2025-03-18 DIAGNOSIS — N91.2 AMENORRHEA: ICD-10-CM

## 2025-03-18 PROCEDURE — 99999 PR PBB SHADOW E&M-EST. PATIENT-LVL II: CPT | Mod: PBBFAC,,, | Performed by: OBSTETRICS & GYNECOLOGY

## 2025-03-18 PROCEDURE — 99212 OFFICE O/P EST SF 10 MIN: CPT | Mod: PBBFAC,PO | Performed by: OBSTETRICS & GYNECOLOGY

## 2025-03-18 PROCEDURE — 99214 OFFICE O/P EST MOD 30 MIN: CPT | Mod: TH,S$PBB,, | Performed by: OBSTETRICS & GYNECOLOGY

## 2025-03-18 PROCEDURE — 76816 OB US FOLLOW-UP PER FETUS: CPT | Mod: PBBFAC,PO | Performed by: STUDENT IN AN ORGANIZED HEALTH CARE EDUCATION/TRAINING PROGRAM

## 2025-03-18 NOTE — PROGRESS NOTES
Good fetal movement. No complaints today.  Waiting for growth US from 3/18/2025    AP: 25 yo  female @ 32.2 wks by 17.2 wk sono EDC 2025 who presents for OB care.    1) SIUP (it's a girl)  --anatomy ok  -mat 21 neg  -consents for vag/blood signed 1/3/2025  -1 hr gtt wnl    2) Rh neg: s/p rhogam on 2025    3) PP  RNI   Contraception: wants Nexplanon (ordered 2025)  Declines vaccines     F/u in 3 wksSMILEY MD

## 2025-03-18 NOTE — LETTER
[facility]  [address]  [facility] [address]  [place] [address]  Phone: [phone] March 18, 2025     Patient: Vilma Rao   YOB: 2000   Date of Visit: 3/18/2025       To Whom It May Concern:    My patient is currently pregnant with a due date of 5/11/2025.    If you have any questions or concerns, please don't hesitate to contact my office.    Sincerely,          Gamaliel Palmer MD

## 2025-03-26 ENCOUNTER — RESULTS FOLLOW-UP (OUTPATIENT)
Dept: OBSTETRICS AND GYNECOLOGY | Facility: CLINIC | Age: 25
End: 2025-03-26

## 2025-04-06 ENCOUNTER — PATIENT MESSAGE (OUTPATIENT)
Dept: OTHER | Facility: OTHER | Age: 25
End: 2025-04-06
Payer: MEDICAID

## 2025-04-08 ENCOUNTER — ROUTINE PRENATAL (OUTPATIENT)
Dept: OBSTETRICS AND GYNECOLOGY | Facility: CLINIC | Age: 25
End: 2025-04-08
Payer: MEDICAID

## 2025-04-08 VITALS
WEIGHT: 208.75 LBS | DIASTOLIC BLOOD PRESSURE: 78 MMHG | SYSTOLIC BLOOD PRESSURE: 110 MMHG | BODY MASS INDEX: 35.84 KG/M2

## 2025-04-08 DIAGNOSIS — Z3A.35 35 WEEKS GESTATION OF PREGNANCY: Primary | ICD-10-CM

## 2025-04-08 LAB — T PALLIDUM AB SER QL IF: NORMAL

## 2025-04-08 PROCEDURE — 87081 CULTURE SCREEN ONLY: CPT | Performed by: OBSTETRICS & GYNECOLOGY

## 2025-04-08 PROCEDURE — 99999 PR PBB SHADOW E&M-EST. PATIENT-LVL II: CPT | Mod: PBBFAC,,, | Performed by: OBSTETRICS & GYNECOLOGY

## 2025-04-08 PROCEDURE — 99212 OFFICE O/P EST SF 10 MIN: CPT | Mod: PBBFAC,PO | Performed by: OBSTETRICS & GYNECOLOGY

## 2025-04-08 NOTE — PROGRESS NOTES
Good fetal movement. No complaints today.  Vertex cervix closed    AP: 24 yo  female @ 32.2 wks by 17.2 wk sono EDC 2025 who presents for OB care.    1) SIUP (it's a girl)  --anatomy ok  -mat 21 neg  -consents for vag/blood signed 1/3/2025  -1 hr gtt wnl  -gbs today    2) Rh neg: s/p rhogam on 2025    3) PP  RNI   Contraception: wants Nexplanon (ordered 2025)  Declines vaccines     F/u in 2 wks OB visit      SMILEY tejeda MD

## 2025-04-11 LAB — BACTERIA SPEC AEROBE CULT: NORMAL

## 2025-04-22 ENCOUNTER — LAB VISIT (OUTPATIENT)
Dept: LAB | Facility: HOSPITAL | Age: 25
End: 2025-04-22
Attending: OBSTETRICS & GYNECOLOGY
Payer: MEDICAID

## 2025-04-22 ENCOUNTER — ROUTINE PRENATAL (OUTPATIENT)
Dept: OBSTETRICS AND GYNECOLOGY | Facility: CLINIC | Age: 25
End: 2025-04-22
Payer: MEDICAID

## 2025-04-22 VITALS
DIASTOLIC BLOOD PRESSURE: 68 MMHG | SYSTOLIC BLOOD PRESSURE: 100 MMHG | HEART RATE: 100 BPM | BODY MASS INDEX: 36.37 KG/M2 | WEIGHT: 211.88 LBS

## 2025-04-22 DIAGNOSIS — Z34.90 ENCOUNTER FOR ELECTIVE INDUCTION OF LABOR: Primary | ICD-10-CM

## 2025-04-22 DIAGNOSIS — Z3A.35 35 WEEKS GESTATION OF PREGNANCY: ICD-10-CM

## 2025-04-22 LAB
ABSOLUTE EOSINOPHIL (OHS): 0.1 K/UL
ABSOLUTE MONOCYTE (OHS): 0.7 K/UL (ref 0.3–1)
ABSOLUTE NEUTROPHIL COUNT (OHS): 8.68 K/UL (ref 1.8–7.7)
BASOPHILS # BLD AUTO: 0.03 K/UL
BASOPHILS NFR BLD AUTO: 0.3 %
ERYTHROCYTE [DISTWIDTH] IN BLOOD BY AUTOMATED COUNT: 14.6 % (ref 11.5–14.5)
HCT VFR BLD AUTO: 35.6 % (ref 37–48.5)
HGB BLD-MCNC: 11.9 GM/DL (ref 12–16)
HIV 1+2 AB+HIV1 P24 AG SERPL QL IA: NORMAL
IMM GRANULOCYTES # BLD AUTO: 0.07 K/UL (ref 0–0.04)
IMM GRANULOCYTES NFR BLD AUTO: 0.6 % (ref 0–0.5)
LYMPHOCYTES # BLD AUTO: 2.31 K/UL (ref 1–4.8)
MCH RBC QN AUTO: 28.5 PG (ref 27–31)
MCHC RBC AUTO-ENTMCNC: 33.4 G/DL (ref 32–36)
MCV RBC AUTO: 85 FL (ref 82–98)
NUCLEATED RBC (/100WBC) (OHS): 0 /100 WBC
PLATELET # BLD AUTO: 251 K/UL (ref 150–450)
PMV BLD AUTO: 10.3 FL (ref 9.2–12.9)
RBC # BLD AUTO: 4.18 M/UL (ref 4–5.4)
RELATIVE EOSINOPHIL (OHS): 0.8 %
RELATIVE LYMPHOCYTE (OHS): 19.4 % (ref 18–48)
RELATIVE MONOCYTE (OHS): 5.9 % (ref 4–15)
RELATIVE NEUTROPHIL (OHS): 73 % (ref 38–73)
T PALLIDUM IGG+IGM SER QL: NORMAL
WBC # BLD AUTO: 11.89 K/UL (ref 3.9–12.7)

## 2025-04-22 PROCEDURE — 99214 OFFICE O/P EST MOD 30 MIN: CPT | Mod: TH,S$PBB,, | Performed by: OBSTETRICS & GYNECOLOGY

## 2025-04-22 PROCEDURE — 36415 COLL VENOUS BLD VENIPUNCTURE: CPT

## 2025-04-22 PROCEDURE — 86593 SYPHILIS TEST NON-TREP QUANT: CPT

## 2025-04-22 PROCEDURE — 99212 OFFICE O/P EST SF 10 MIN: CPT | Mod: PBBFAC,TH,PO | Performed by: OBSTETRICS & GYNECOLOGY

## 2025-04-22 PROCEDURE — 99999 PR PBB SHADOW E&M-EST. PATIENT-LVL II: CPT | Mod: PBBFAC,,, | Performed by: OBSTETRICS & GYNECOLOGY

## 2025-04-22 PROCEDURE — 87389 HIV-1 AG W/HIV-1&-2 AB AG IA: CPT

## 2025-04-22 PROCEDURE — 85025 COMPLETE CBC W/AUTO DIFF WBC: CPT

## 2025-04-22 NOTE — PROGRESS NOTES
Good fetal movement. No complaints today.  Vertex cervix FT    AP: 26 yo  female @ 37.2 wks by 17.2 wk sono EDC 2025 who presents for OB care.    1) SIUP (it's a girl)  --anatomy ok  -mat 21 neg  -consents for vag/blood signed 1/3/2025  -1 hr gtt wnl  -gbs neg    2) Rh neg: s/p rhogam on 2025    3) PP  RNI   Contraception: wants Nexplanon (ordered 2025)  Declines vaccines     F/u in 1 wk  IOL on May 5 at 5am      SMILEY tejeda MD

## 2025-04-29 ENCOUNTER — RESULTS FOLLOW-UP (OUTPATIENT)
Dept: OBSTETRICS AND GYNECOLOGY | Facility: HOSPITAL | Age: 25
End: 2025-04-29

## 2025-04-30 ENCOUNTER — ROUTINE PRENATAL (OUTPATIENT)
Dept: OBSTETRICS AND GYNECOLOGY | Facility: CLINIC | Age: 25
End: 2025-04-30
Payer: MEDICAID

## 2025-04-30 VITALS — WEIGHT: 211 LBS | BODY MASS INDEX: 36.21 KG/M2 | SYSTOLIC BLOOD PRESSURE: 108 MMHG | DIASTOLIC BLOOD PRESSURE: 73 MMHG

## 2025-04-30 DIAGNOSIS — Z3A.38 38 WEEKS GESTATION OF PREGNANCY: Primary | ICD-10-CM

## 2025-04-30 DIAGNOSIS — Z34.90 ENCOUNTER FOR ELECTIVE INDUCTION OF LABOR: ICD-10-CM

## 2025-04-30 PROCEDURE — 99214 OFFICE O/P EST MOD 30 MIN: CPT | Mod: TH,S$PBB,, | Performed by: OBSTETRICS & GYNECOLOGY

## 2025-04-30 PROCEDURE — 99212 OFFICE O/P EST SF 10 MIN: CPT | Mod: PBBFAC,TH,PO | Performed by: OBSTETRICS & GYNECOLOGY

## 2025-04-30 PROCEDURE — 99999 PR PBB SHADOW E&M-EST. PATIENT-LVL II: CPT | Mod: PBBFAC,,, | Performed by: OBSTETRICS & GYNECOLOGY

## 2025-04-30 RX ORDER — SIMETHICONE 80 MG
1 TABLET,CHEWABLE ORAL 4 TIMES DAILY PRN
OUTPATIENT
Start: 2025-04-30

## 2025-04-30 RX ORDER — SODIUM CHLORIDE 9 MG/ML
INJECTION, SOLUTION INTRAVENOUS
OUTPATIENT
Start: 2025-04-30

## 2025-04-30 RX ORDER — SODIUM CHLORIDE, SODIUM LACTATE, POTASSIUM CHLORIDE, CALCIUM CHLORIDE 600; 310; 30; 20 MG/100ML; MG/100ML; MG/100ML; MG/100ML
INJECTION, SOLUTION INTRAVENOUS CONTINUOUS
OUTPATIENT
Start: 2025-04-30

## 2025-04-30 RX ORDER — METHYLERGONOVINE MALEATE 0.2 MG/ML
200 INJECTION INTRAVENOUS ONCE AS NEEDED
OUTPATIENT
Start: 2025-04-30 | End: 2036-09-26

## 2025-04-30 RX ORDER — CALCIUM CARBONATE 200(500)MG
500 TABLET,CHEWABLE ORAL 3 TIMES DAILY PRN
OUTPATIENT
Start: 2025-04-30

## 2025-04-30 RX ORDER — MISOPROSTOL 200 UG/1
800 TABLET ORAL ONCE AS NEEDED
OUTPATIENT
Start: 2025-04-30 | End: 2036-09-26

## 2025-04-30 RX ORDER — MISOPROSTOL 200 UG/1
800 TABLET ORAL ONCE AS NEEDED
OUTPATIENT
Start: 2025-04-30

## 2025-04-30 RX ORDER — OXYTOCIN-SODIUM CHLORIDE 0.9% IV SOLN 30 UNIT/500ML 30-0.9/5 UT/ML-%
10 SOLUTION INTRAVENOUS ONCE AS NEEDED
OUTPATIENT
Start: 2025-04-30 | End: 2036-09-26

## 2025-04-30 RX ORDER — ONDANSETRON 8 MG/1
8 TABLET, ORALLY DISINTEGRATING ORAL EVERY 8 HOURS PRN
OUTPATIENT
Start: 2025-04-30

## 2025-04-30 RX ORDER — OXYTOCIN/0.9 % SODIUM CHLORIDE 15/250 ML
0-32 PLASTIC BAG, INJECTION (ML) INTRAVENOUS CONTINUOUS
OUTPATIENT
Start: 2025-04-30

## 2025-04-30 RX ORDER — LIDOCAINE HYDROCHLORIDE 10 MG/ML
10 INJECTION, SOLUTION INFILTRATION; PERINEURAL ONCE AS NEEDED
OUTPATIENT
Start: 2025-04-30 | End: 2036-09-26

## 2025-04-30 RX ORDER — DIPHENOXYLATE HYDROCHLORIDE AND ATROPINE SULFATE 2.5; .025 MG/1; MG/1
2 TABLET ORAL EVERY 6 HOURS PRN
OUTPATIENT
Start: 2025-04-30

## 2025-04-30 RX ORDER — CARBOPROST TROMETHAMINE 250 UG/ML
250 INJECTION, SOLUTION INTRAMUSCULAR
OUTPATIENT
Start: 2025-04-30

## 2025-04-30 RX ORDER — OXYTOCIN/0.9 % SODIUM CHLORIDE 15/250 ML
95 PLASTIC BAG, INJECTION (ML) INTRAVENOUS ONCE AS NEEDED
OUTPATIENT
Start: 2025-04-30 | End: 2036-09-26

## 2025-04-30 RX ORDER — OXYTOCIN/0.9 % SODIUM CHLORIDE 15/250 ML
95 PLASTIC BAG, INJECTION (ML) INTRAVENOUS CONTINUOUS PRN
OUTPATIENT
Start: 2025-04-30

## 2025-04-30 RX ORDER — OXYTOCIN 10 [USP'U]/ML
10 INJECTION, SOLUTION INTRAMUSCULAR; INTRAVENOUS ONCE AS NEEDED
OUTPATIENT
Start: 2025-04-30 | End: 2036-09-26

## 2025-04-30 RX ORDER — MUPIROCIN 20 MG/G
OINTMENT TOPICAL
OUTPATIENT
Start: 2025-04-30

## 2025-04-30 NOTE — PROGRESS NOTES
Good fetal movement. No complaints today.  Vertex cervix 1.5cm dilated    AP: 24 yo  female @ 38.3 wks by 17.2 wk sono EDC 2025 who presents for OB care.    1) SIUP (it's a girl)  --anatomy ok  -mat 21 neg  -consents for vag/blood signed 1/3/2025  -1 hr gtt wnl  -gbs neg    2) Rh neg: s/p rhogam on 2025    3) PP  RNI   Contraception: wants Nexplanon (ordered 2025) - in the hospital, if possible  Declines vaccines     F/u in 1 wk for IOL on May 5 at 5am (\[facility]\"")    SMILEY tejeda MD

## 2025-05-05 ENCOUNTER — ANESTHESIA EVENT (OUTPATIENT)
Dept: OBSTETRICS AND GYNECOLOGY | Facility: HOSPITAL | Age: 25
End: 2025-05-05
Payer: MEDICAID

## 2025-05-05 ENCOUNTER — HOSPITAL ENCOUNTER (INPATIENT)
Facility: HOSPITAL | Age: 25
LOS: 2 days | Discharge: HOME | End: 2025-05-07
Attending: OBSTETRICS & GYNECOLOGY | Admitting: OBSTETRICS & GYNECOLOGY
Payer: MEDICAID

## 2025-05-05 ENCOUNTER — ANESTHESIA (OUTPATIENT)
Dept: OBSTETRICS AND GYNECOLOGY | Facility: HOSPITAL | Age: 25
End: 2025-05-05
Payer: MEDICAID

## 2025-05-05 DIAGNOSIS — Z34.90 ENCOUNTER FOR ELECTIVE INDUCTION OF LABOR: ICD-10-CM

## 2025-05-05 DIAGNOSIS — Z30.017 ENCOUNTER FOR INITIAL PRESCRIPTION OF IMPLANTABLE SUBDERMAL CONTRACEPTIVE: ICD-10-CM

## 2025-05-05 LAB
ABSOLUTE EOSINOPHIL (OHS): 0.09 K/UL
ABSOLUTE MONOCYTE (OHS): 0.72 K/UL (ref 0.3–1)
ABSOLUTE NEUTROPHIL COUNT (OHS): 8.13 K/UL (ref 1.8–7.7)
ALBUMIN SERPL BCP-MCNC: 2.9 G/DL (ref 3.5–5.2)
ALP SERPL-CCNC: 137 UNIT/L (ref 40–150)
ALT SERPL W/O P-5'-P-CCNC: 11 UNIT/L (ref 10–44)
ANION GAP (OHS): 11 MMOL/L (ref 8–16)
AST SERPL-CCNC: 15 UNIT/L (ref 11–45)
BASOPHILS # BLD AUTO: 0.03 K/UL
BASOPHILS NFR BLD AUTO: 0.3 %
BILIRUB SERPL-MCNC: 0.2 MG/DL (ref 0.1–1)
BUN SERPL-MCNC: 8 MG/DL (ref 6–20)
CALCIUM SERPL-MCNC: 9.2 MG/DL (ref 8.7–10.5)
CHLORIDE SERPL-SCNC: 104 MMOL/L (ref 95–110)
CO2 SERPL-SCNC: 20 MMOL/L (ref 23–29)
CREAT SERPL-MCNC: 0.5 MG/DL (ref 0.5–1.4)
ERYTHROCYTE [DISTWIDTH] IN BLOOD BY AUTOMATED COUNT: 14.8 % (ref 11.5–14.5)
GFR SERPLBLD CREATININE-BSD FMLA CKD-EPI: >60 ML/MIN/1.73/M2
GLUCOSE SERPL-MCNC: 81 MG/DL (ref 70–110)
GROUP & RH: NORMAL
HCT VFR BLD AUTO: 34.6 % (ref 37–48.5)
HGB BLD-MCNC: 11.3 GM/DL (ref 12–16)
IMM GRANULOCYTES # BLD AUTO: 0.06 K/UL (ref 0–0.04)
IMM GRANULOCYTES NFR BLD AUTO: 0.5 % (ref 0–0.5)
INDIRECT COOMBS: NORMAL
LYMPHOCYTES # BLD AUTO: 2.87 K/UL (ref 1–4.8)
MCH RBC QN AUTO: 28 PG (ref 27–31)
MCHC RBC AUTO-ENTMCNC: 32.7 G/DL (ref 32–36)
MCV RBC AUTO: 86 FL (ref 82–98)
NUCLEATED RBC (/100WBC) (OHS): 0 /100 WBC
PLATELET # BLD AUTO: 230 K/UL (ref 150–450)
PMV BLD AUTO: 9.9 FL (ref 9.2–12.9)
POTASSIUM SERPL-SCNC: 3 MMOL/L (ref 3.5–5.1)
PROT SERPL-MCNC: 7.4 GM/DL (ref 6–8.4)
RBC # BLD AUTO: 4.04 M/UL (ref 4–5.4)
RELATIVE EOSINOPHIL (OHS): 0.8 %
RELATIVE LYMPHOCYTE (OHS): 24.1 % (ref 18–48)
RELATIVE MONOCYTE (OHS): 6.1 % (ref 4–15)
RELATIVE NEUTROPHIL (OHS): 68.2 % (ref 38–73)
SODIUM SERPL-SCNC: 135 MMOL/L (ref 136–145)
T PALLIDUM IGG+IGM SER QL: NORMAL
WBC # BLD AUTO: 11.9 K/UL (ref 3.9–12.7)
WEAK D AG RBC QL: NORMAL

## 2025-05-05 PROCEDURE — 10907ZC DRAINAGE OF AMNIOTIC FLUID, THERAPEUTIC FROM PRODUCTS OF CONCEPTION, VIA NATURAL OR ARTIFICIAL OPENING: ICD-10-PCS | Performed by: OBSTETRICS & GYNECOLOGY

## 2025-05-05 PROCEDURE — 72100002 HC LABOR CARE, 1ST 8 HOURS

## 2025-05-05 PROCEDURE — 85025 COMPLETE CBC W/AUTO DIFF WBC: CPT | Performed by: OBSTETRICS & GYNECOLOGY

## 2025-05-05 PROCEDURE — 62326 NJX INTERLAMINAR LMBR/SAC: CPT | Performed by: NURSE ANESTHETIST, CERTIFIED REGISTERED

## 2025-05-05 PROCEDURE — 94761 N-INVAS EAR/PLS OXIMETRY MLT: CPT

## 2025-05-05 PROCEDURE — 51702 INSERT TEMP BLADDER CATH: CPT

## 2025-05-05 PROCEDURE — 80053 COMPREHEN METABOLIC PANEL: CPT | Performed by: OBSTETRICS & GYNECOLOGY

## 2025-05-05 PROCEDURE — 3E033VJ INTRODUCTION OF OTHER HORMONE INTO PERIPHERAL VEIN, PERCUTANEOUS APPROACH: ICD-10-PCS | Performed by: OBSTETRICS & GYNECOLOGY

## 2025-05-05 PROCEDURE — 63600175 PHARM REV CODE 636 W HCPCS: Performed by: OBSTETRICS & GYNECOLOGY

## 2025-05-05 PROCEDURE — 11000001 HC ACUTE MED/SURG PRIVATE ROOM

## 2025-05-05 PROCEDURE — 86593 SYPHILIS TEST NON-TREP QUANT: CPT | Performed by: OBSTETRICS & GYNECOLOGY

## 2025-05-05 PROCEDURE — 36415 COLL VENOUS BLD VENIPUNCTURE: CPT | Performed by: OBSTETRICS & GYNECOLOGY

## 2025-05-05 PROCEDURE — 99900035 HC TECH TIME PER 15 MIN (STAT)

## 2025-05-05 PROCEDURE — C1751 CATH, INF, PER/CENT/MIDLINE: HCPCS | Performed by: STUDENT IN AN ORGANIZED HEALTH CARE EDUCATION/TRAINING PROGRAM

## 2025-05-05 PROCEDURE — 72100003 HC LABOR CARE, EA. ADDL. 8 HRS

## 2025-05-05 PROCEDURE — 25000003 PHARM REV CODE 250: Performed by: NURSE ANESTHETIST, CERTIFIED REGISTERED

## 2025-05-05 PROCEDURE — 86901 BLOOD TYPING SEROLOGIC RH(D): CPT | Performed by: OBSTETRICS & GYNECOLOGY

## 2025-05-05 PROCEDURE — 27200710 HC EPIDURAL INFUSION PUMP SET: Performed by: STUDENT IN AN ORGANIZED HEALTH CARE EDUCATION/TRAINING PROGRAM

## 2025-05-05 PROCEDURE — 27000181 HC CABLE, IUPC

## 2025-05-05 PROCEDURE — 72200005 HC VAGINAL DELIVERY LEVEL II

## 2025-05-05 RX ORDER — OXYTOCIN-SODIUM CHLORIDE 0.9% IV SOLN 30 UNIT/500ML 30-0.9/5 UT/ML-%
95 SOLUTION INTRAVENOUS CONTINUOUS PRN
Status: DISCONTINUED | OUTPATIENT
Start: 2025-05-05 | End: 2025-05-07 | Stop reason: HOSPADM

## 2025-05-05 RX ORDER — ONDANSETRON 8 MG/1
8 TABLET, ORALLY DISINTEGRATING ORAL EVERY 8 HOURS PRN
Status: DISCONTINUED | OUTPATIENT
Start: 2025-05-06 | End: 2025-05-07 | Stop reason: HOSPADM

## 2025-05-05 RX ORDER — NALBUPHINE HYDROCHLORIDE 10 MG/ML
2.5 INJECTION INTRAMUSCULAR; INTRAVENOUS; SUBCUTANEOUS ONCE
Status: DISCONTINUED | OUTPATIENT
Start: 2025-05-05 | End: 2025-05-05

## 2025-05-05 RX ORDER — OXYTOCIN 10 [USP'U]/ML
10 INJECTION, SOLUTION INTRAMUSCULAR; INTRAVENOUS ONCE AS NEEDED
Status: DISCONTINUED | OUTPATIENT
Start: 2025-05-05 | End: 2025-05-05

## 2025-05-05 RX ORDER — SODIUM CHLORIDE 9 MG/ML
INJECTION, SOLUTION INTRAVENOUS
Status: DISCONTINUED | OUTPATIENT
Start: 2025-05-05 | End: 2025-05-05

## 2025-05-05 RX ORDER — IBUPROFEN 600 MG/1
600 TABLET, FILM COATED ORAL EVERY 6 HOURS PRN
Status: DISCONTINUED | OUTPATIENT
Start: 2025-05-06 | End: 2025-05-07 | Stop reason: HOSPADM

## 2025-05-05 RX ORDER — FENTANYL/BUPIVACAINE/NS/PF 2MCG/ML-.1
PLASTIC BAG, INJECTION (ML) INJECTION CONTINUOUS
Status: DISCONTINUED | OUTPATIENT
Start: 2025-05-05 | End: 2025-05-05

## 2025-05-05 RX ORDER — ACETAMINOPHEN 325 MG/1
650 TABLET ORAL EVERY 6 HOURS PRN
Status: DISCONTINUED | OUTPATIENT
Start: 2025-05-06 | End: 2025-05-07 | Stop reason: HOSPADM

## 2025-05-05 RX ORDER — OXYTOCIN-SODIUM CHLORIDE 0.9% IV SOLN 30 UNIT/500ML 30-0.9/5 UT/ML-%
95 SOLUTION INTRAVENOUS ONCE AS NEEDED
Status: DISCONTINUED | OUTPATIENT
Start: 2025-05-05 | End: 2025-05-07 | Stop reason: HOSPADM

## 2025-05-05 RX ORDER — NALOXONE HCL 0.4 MG/ML
0.4 VIAL (ML) INJECTION SEE ADMIN INSTRUCTIONS
Status: DISCONTINUED | OUTPATIENT
Start: 2025-05-05 | End: 2025-05-05

## 2025-05-05 RX ORDER — CARBOPROST TROMETHAMINE 250 UG/ML
250 INJECTION, SOLUTION INTRAMUSCULAR
Status: DISCONTINUED | OUTPATIENT
Start: 2025-05-05 | End: 2025-05-05

## 2025-05-05 RX ORDER — OXYTOCIN-SODIUM CHLORIDE 0.9% IV SOLN 30 UNIT/500ML 30-0.9/5 UT/ML-%
10 SOLUTION INTRAVENOUS ONCE AS NEEDED
Status: DISCONTINUED | OUTPATIENT
Start: 2025-05-05 | End: 2025-05-05

## 2025-05-05 RX ORDER — OXYCODONE AND ACETAMINOPHEN 5; 325 MG/1; MG/1
1 TABLET ORAL EVERY 4 HOURS PRN
Status: DISCONTINUED | OUTPATIENT
Start: 2025-05-05 | End: 2025-05-07 | Stop reason: HOSPADM

## 2025-05-05 RX ORDER — DIPHENHYDRAMINE HYDROCHLORIDE 50 MG/ML
25 INJECTION, SOLUTION INTRAMUSCULAR; INTRAVENOUS EVERY 4 HOURS PRN
Status: DISCONTINUED | OUTPATIENT
Start: 2025-05-06 | End: 2025-05-07 | Stop reason: HOSPADM

## 2025-05-05 RX ORDER — DIPHENOXYLATE HYDROCHLORIDE AND ATROPINE SULFATE 2.5; .025 MG/1; MG/1
2 TABLET ORAL EVERY 6 HOURS PRN
Status: DISCONTINUED | OUTPATIENT
Start: 2025-05-05 | End: 2025-05-05

## 2025-05-05 RX ORDER — OXYTOCIN-SODIUM CHLORIDE 0.9% IV SOLN 30 UNIT/500ML 30-0.9/5 UT/ML-%
10 SOLUTION INTRAVENOUS ONCE AS NEEDED
Status: DISCONTINUED | OUTPATIENT
Start: 2025-05-06 | End: 2025-05-07 | Stop reason: HOSPADM

## 2025-05-05 RX ORDER — SIMETHICONE 80 MG
1 TABLET,CHEWABLE ORAL 4 TIMES DAILY PRN
Status: DISCONTINUED | OUTPATIENT
Start: 2025-05-05 | End: 2025-05-05

## 2025-05-05 RX ORDER — METHYLERGONOVINE MALEATE 0.2 MG/ML
200 INJECTION INTRAVENOUS ONCE AS NEEDED
Status: DISCONTINUED | OUTPATIENT
Start: 2025-05-05 | End: 2025-05-05

## 2025-05-05 RX ORDER — OXYTOCIN-SODIUM CHLORIDE 0.9% IV SOLN 30 UNIT/500ML 30-0.9/5 UT/ML-%
0-32 SOLUTION INTRAVENOUS CONTINUOUS
Status: DISCONTINUED | OUTPATIENT
Start: 2025-05-05 | End: 2025-05-05

## 2025-05-05 RX ORDER — MISOPROSTOL 200 UG/1
800 TABLET ORAL ONCE AS NEEDED
Status: DISCONTINUED | OUTPATIENT
Start: 2025-05-05 | End: 2025-05-05

## 2025-05-05 RX ORDER — SIMETHICONE 80 MG
1 TABLET,CHEWABLE ORAL EVERY 6 HOURS PRN
Status: DISCONTINUED | OUTPATIENT
Start: 2025-05-06 | End: 2025-05-07 | Stop reason: HOSPADM

## 2025-05-05 RX ORDER — MISOPROSTOL 200 UG/1
800 TABLET ORAL ONCE AS NEEDED
Status: DISCONTINUED | OUTPATIENT
Start: 2025-05-05 | End: 2025-05-07 | Stop reason: HOSPADM

## 2025-05-05 RX ORDER — LIDOCAINE HYDROCHLORIDE 10 MG/ML
10 INJECTION, SOLUTION INFILTRATION; PERINEURAL ONCE AS NEEDED
Status: DISCONTINUED | OUTPATIENT
Start: 2025-05-05 | End: 2025-05-05

## 2025-05-05 RX ORDER — ONDANSETRON 8 MG/1
8 TABLET, ORALLY DISINTEGRATING ORAL EVERY 8 HOURS PRN
Status: DISCONTINUED | OUTPATIENT
Start: 2025-05-05 | End: 2025-05-05

## 2025-05-05 RX ORDER — MUPIROCIN 20 MG/G
OINTMENT TOPICAL
Status: DISCONTINUED | OUTPATIENT
Start: 2025-05-05 | End: 2025-05-05

## 2025-05-05 RX ORDER — CARBOPROST TROMETHAMINE 250 UG/ML
250 INJECTION, SOLUTION INTRAMUSCULAR
Status: DISCONTINUED | OUTPATIENT
Start: 2025-05-06 | End: 2025-05-07 | Stop reason: HOSPADM

## 2025-05-05 RX ORDER — FENTANYL/BUPIVACAINE/NS/PF 2MCG/ML-.1
PLASTIC BAG, INJECTION (ML) INJECTION
Status: DISCONTINUED | OUTPATIENT
Start: 2025-05-05 | End: 2025-05-05

## 2025-05-05 RX ORDER — DIPHENHYDRAMINE HYDROCHLORIDE 50 MG/ML
12.5 INJECTION, SOLUTION INTRAMUSCULAR; INTRAVENOUS EVERY 4 HOURS PRN
Status: DISCONTINUED | OUTPATIENT
Start: 2025-05-05 | End: 2025-05-05

## 2025-05-05 RX ORDER — OXYTOCIN 10 [USP'U]/ML
10 INJECTION, SOLUTION INTRAMUSCULAR; INTRAVENOUS ONCE AS NEEDED
Status: DISCONTINUED | OUTPATIENT
Start: 2025-05-06 | End: 2025-05-07 | Stop reason: HOSPADM

## 2025-05-05 RX ORDER — OXYTOCIN-SODIUM CHLORIDE 0.9% IV SOLN 30 UNIT/500ML 30-0.9/5 UT/ML-%
95 SOLUTION INTRAVENOUS ONCE AS NEEDED
Status: COMPLETED | OUTPATIENT
Start: 2025-05-05 | End: 2025-05-05

## 2025-05-05 RX ORDER — SODIUM CHLORIDE 0.9 % (FLUSH) 0.9 %
10 SYRINGE (ML) INJECTION
Status: DISCONTINUED | OUTPATIENT
Start: 2025-05-06 | End: 2025-05-07 | Stop reason: HOSPADM

## 2025-05-05 RX ORDER — HYDROCORTISONE 25 MG/G
CREAM TOPICAL 3 TIMES DAILY PRN
Status: DISCONTINUED | OUTPATIENT
Start: 2025-05-06 | End: 2025-05-07 | Stop reason: HOSPADM

## 2025-05-05 RX ORDER — FAMOTIDINE 10 MG/ML
20 INJECTION, SOLUTION INTRAVENOUS ONCE
Status: DISCONTINUED | OUTPATIENT
Start: 2025-05-05 | End: 2025-05-05

## 2025-05-05 RX ORDER — METHYLERGONOVINE MALEATE 0.2 MG/ML
200 INJECTION INTRAVENOUS ONCE AS NEEDED
Status: DISCONTINUED | OUTPATIENT
Start: 2025-05-06 | End: 2025-05-07 | Stop reason: HOSPADM

## 2025-05-05 RX ORDER — OXYTOCIN-SODIUM CHLORIDE 0.9% IV SOLN 30 UNIT/500ML 30-0.9/5 UT/ML-%
95 SOLUTION INTRAVENOUS CONTINUOUS PRN
Status: DISCONTINUED | OUTPATIENT
Start: 2025-05-05 | End: 2025-05-05

## 2025-05-05 RX ORDER — SODIUM CHLORIDE, SODIUM LACTATE, POTASSIUM CHLORIDE, CALCIUM CHLORIDE 600; 310; 30; 20 MG/100ML; MG/100ML; MG/100ML; MG/100ML
INJECTION, SOLUTION INTRAVENOUS CONTINUOUS
Status: DISCONTINUED | OUTPATIENT
Start: 2025-05-05 | End: 2025-05-05

## 2025-05-05 RX ORDER — SODIUM CITRATE AND CITRIC ACID MONOHYDRATE 334; 500 MG/5ML; MG/5ML
30 SOLUTION ORAL ONCE
Status: DISCONTINUED | OUTPATIENT
Start: 2025-05-05 | End: 2025-05-05

## 2025-05-05 RX ORDER — DIPHENOXYLATE HYDROCHLORIDE AND ATROPINE SULFATE 2.5; .025 MG/1; MG/1
2 TABLET ORAL EVERY 6 HOURS PRN
Status: DISCONTINUED | OUTPATIENT
Start: 2025-05-06 | End: 2025-05-07 | Stop reason: HOSPADM

## 2025-05-05 RX ORDER — MISOPROSTOL 200 UG/1
800 TABLET ORAL ONCE AS NEEDED
Status: DISCONTINUED | OUTPATIENT
Start: 2025-05-06 | End: 2025-05-07 | Stop reason: HOSPADM

## 2025-05-05 RX ORDER — CALCIUM CARBONATE 200(500)MG
500 TABLET,CHEWABLE ORAL 3 TIMES DAILY PRN
Status: DISCONTINUED | OUTPATIENT
Start: 2025-05-05 | End: 2025-05-05

## 2025-05-05 RX ADMIN — Medication 5 ML: at 04:05

## 2025-05-05 RX ADMIN — SODIUM CHLORIDE, POTASSIUM CHLORIDE, SODIUM LACTATE AND CALCIUM CHLORIDE: 600; 310; 30; 20 INJECTION, SOLUTION INTRAVENOUS at 06:05

## 2025-05-05 RX ADMIN — Medication 334 MILLI-UNITS/MIN: at 11:05

## 2025-05-05 RX ADMIN — Medication 4 MILLI-UNITS/MIN: at 06:05

## 2025-05-05 RX ADMIN — Medication 12 ML/HR: at 04:05

## 2025-05-05 NOTE — DISCHARGE INSTRUCTIONS
Instrucciones de la lactancia maternal para los bebes recién nacidos:   La Academia de Pediatra Americana recomienda la leche maternal jane la unica Diana de nutrición para johnson bebé mark los primeros 6 meses de la graham, y puede continuar, con la introducción de comida, hasta y despues de 1 ano de edad. Informado sobre anguiano consejos: Hay muchos beneficios de amamantar para el nikki de la madre y del bebé. Kristen los chupones, teteras, o botellas por lo menos por las primeras 4 semanas. Usar los chupones, teteras, o botellas pueden interumpir el proceso de amamantar.   Alimenta en cuanto hay muestras de hambre:  Padmini en la boca, hacienda movimientos de succionar.  Ruiditos suavecitos o estirarse  Llorar es un signo de hambre tardío: no esperes a que el bebé llore.  Es de esperarse que haya 8-12 alimentaciones por 24 horas, aunque estas alimentaciones no sigan un horario definido.  Alterna el seno con el que empiezas, o empieza con el seno que se siente más lleno.  Cambia de lado cuando el bebé empiece a tragar más despacio o se desconecte del seno.  Esta fredi si el bebé no come del yahaira seno en cada alimentación.  Si el bebé esta muy dormido o somnoliente: el contacto de piel a piel puede animarlo a empezar a comer:  Quítale la camiseta y acuéstalo sobre tu pecho desnudo  Duerme cerca de tu bebé, aun en la casa. Aprende a sari pecho acostada.  En la posición correcta los dos estarán cómodos:  barbilla con seno, pecho con pecho  Labio del bebé abierto hacia afuera para tragar: el labio del bebé debe estar volteado hacia afuera  Boca abierta fredi fanny: la boca del bebé cubre la mayoría de la areola (el área oscura del seno)--no nada más el pezón  Fíjate en los signos de que hay transferencia de leche:  Puedes oír al bebé tragar.  No se oyen ruidos más o menos naveen jane clic.  El bebé no demuestra que tiene más hambre después de la alimentación.  El cuerpo del bebé y so padmini están relajados por un tiempo  corto.  Lo que entra, tiene que salir. Está pendiente de:  Al cuarto día, por lo menos 3 cacas al día.  La rafa cambia de rafael a emeli o café y luego a amarillo más líquido cuando baja tu leche.  Después del cuarto día, por lo menos 6 pa?ales mojados/pesados al día.  La orina debe ser de color amarillo dk cuando baja tu leche.  Debes tracie agua cuando tienes sed y comer alimentos sanos cuando tiene hambre. Lilian siesta para descansar lo suficiente.   No tomes medicamentos o alcohol sino con el consejo de johnson doctor. Puedes llamar al [facility] (Infant Risk Center): ([phone]), Lunes a Viernes, 8am-5pm, para obtener información sobre los medicamentos y la leche maternal.  La jose de seguimiento con la pediatra debe ser 2 días después de salir del hospital. El bebé deberia judith ganado el peso de nacimiento cuando tiene 10-14 días de graham.    Recursos comunitarios:    [facility] Línea directa de lactancia materna: [phone]    Centros hospitalarios de lactancia materna/Consultores de lactancia:  [facility] [phone]  [facility] [phone]  Consultas ambulatorias sobre lactancia de [facility] [phone]  [facility] [phone]    [facility] de Envenenamientos [phone] [email or web address].org  Asesoramiento médico gratuito las 24 horas del día, los 7 días de la semana a través de la Línea de ayuda contra intoxicaciones y la herramienta en línea    WIC (minda malik, saurabh): [phone] ldh.la.gov/WIC  Un programa de nutrición a nivel estatal para mujeres embarazadas, lactantes y posparto, bebés y niños menores de 5 años. Proporciona información sobre alimentos y nutrición. También proporciona apoyo a la lactancia materna por parte de madres consejeras.    Sitio web del Dr. Johnny Fox para vídeos sobre el cierre e información general:        www.breastfeedinginc.ca  Infant Risk Center es un centro de llamadas que uvaldo información sobre la  seguridad de tracie medicamentos mark la lactancia.  Llame al [phone], de lunes a viernes, de 8 a.m. a 5 p.m., CT.  La Asociación Internacional de Consultores en Lactancia proporciona recursos de asistencia:        www.ilca.org  La Coalición de Lactancia Materna de isiana proporciona información y recursos para padres  y la comunidad [email or web address] [email or web address]  Búsqueda por código postal de recursos sobre lactancia materna y más:                                                                              www.[email or web address].org       Socios para bebés sanos: [phone])    Recursos del área de [facility]:   Servicios de [facility]: desno.org  Los centros de nikki comunitarios están ubicados en Fede, Joaquin, Briseyda, [place], [place], Ana [place], [place], [facility] y [facility]. Ofrece servicios pediátricos, servicios de nikki para mujeres, servicios WIC y más.   Bebé Café [email or web address].org  Grupos informales, gratuitos y sin jose previa de apoyo a la lactancia materna que ofrecen atención e intervención profesional en lactancia.  Ana SÁNCHEZLA Baby Café se reúne los jueves en [facility] (ubicado en el salón Creole en el primer piso) de 1:00 p. m. a 3:00 p. m. Un traductor de español está disponible en esta reunión.   Birthmark Doulas/Centro de lactancia materna de [facility]: [email or web address].com  Entrega de alimentación infantil en clínicas, servicios de lactancia, grupos de apoyo, programas educativos.   Café con leche: [phone] Test.tv.[employer]/groups/cafeaulaitlouisiana  Mark de apoyo gratuito a la lactancia materna para familias de color   Anidación de CORTNEY: [phone] [email or web address]  Apoyo presencial y virtual para familias mark el embarazo, el parto y la paternidad temprana.   [facility], "[employer]" (April Last RN, IBCLC)  [phone]  [email or web address]  [email or web address]   Medicina Avanzada de Lactancia  Materna de [place]: Dra. Cinthya Santiago.  [address] [place] [address]  [email or web address]  [phone]  [email or web address]   Comienzo Saludable Nusuresh [place].  [phone] ([place]) [phone] ([place])  xochitl.gov/health-department/healthy-start  Atiende a mujeres en edad fértil y aborda problemas de mujeres embarazadas y so hijos desde el nacimiento hasta los dos años.   Liga La Leche - [place] de [place] Donato.Hiddenbed/PlayBucks/Zenvergejanet  Grupos de apoyo madre a madre presenciales y virtuales con educación, información, apoyo y estímulo a las mujeres que lois amamantar.         Instrucciones Para Sari de Natalia    Instrucciones a Seguir    Dieta regular  Actividad: Aumentarntar gradualmente  Dick: Regadera o Leigh  Restricciones: No levantar nada pesado  Cuidado Personal: No tampones o duchas vaginales  Actividad Sexual: Kristen relaciones sexuales  Planificacion Familiar: Consulta con johnson medico  Cuidado de los Senos: Use un sosten de soporte  Regresar al trabajo/escuela: Cuando johnson medico le indique    Cuando debe llamar al Doctor    Fiebre de 100.4 o mas alto  Nausea/Vomito persistente  Secrecion de la incision  Fredi sangrado vaginal o coagulos  Inflamacion/dolor en los brazos o las piernas  Severo dolor de edy, vision borrosa or desmayos  Frequencia/ardor urinario  Senales de depresion post-parto      La Depresion Post-Parto    Usted acaba de tener un alberto'.  A pesar de que sabe que deberia sentirse emocionada y de la torre, lo que seinte son ganas de llorar sin motivo y tiene dificultades para realizar so tareas diarias.  Usted pasa la mayor parte del tiempo aldo, cansada y desesperanzada, y hasta podria sentirse avergonzada o culpable.  Andrei lo que le esta sucediendo no es culpa suya, y puede sentirse mejor.  Hable con johnson medico para que la ayude.     La Depresion Despues del Parto    Mustapha vez sienta cansancio y ganas de llorar adria despues de sari a bhaskar.   "Esta etapa melancolica, denominada "baby blues", puede hacerla sentir aldo y desesperanzada, o temerosa de que algo pedro pablo le vaya a pasar al alberto.  Algunas mujeres hasta llegan a poner en rasheed el que puedan ser buenas madres.    Que' es la Depresion?    La depresion es un trastorno del animo que afecta johnson manera de pernsar y de sentir.  El sintoma mas frecuente es un sentimiento de honda tristeza: tambien podria causane la sensacion de que ya no puede sobrellevar la graham.    Otros sintomas incluyen:      * Ganar o perder mucho peso  * Dormir en exceso o demasiado poco  * Estar cansada todo el tiempo  * Sentirse inquieta  * Tener miedo de querer herir al alberto'  * No tener interes por el alberto'  * Sentirse inutil o culpable   * No encontrar placer en las cosas que solia gustarle hacer  * Dificultades para pensar con claridad o tracie decisiones  * Pensar sobre la muerte o el suicidio     Que' Causa la Depresion Postparto?    La causea exacta de la depresion postarto se desconce, aunque posiblemente es el resultado de los cambios hormonales que suceden mark y despues del parto.  Tambien puede deberse al cansancio que le causan las exigencias del alberto' y el proceso de adaptacion a johnson maternidad.  Todos estos factores podrian deprimirla.  En algunos casos, existe letty predisposicion genetica a gretchen tipo de depresion.    La depresion puede tratarse    Lo brown es que hay muchas maneras de tratar la depresion postparo.  Emprenda el primer paso para sentirse mejor hablando con johnson medico.     Recursos    * National Institutes of Mental Health-- [phone]     www.[facility].nih.gov    * National [place] on Mental Illness -- [phone]     Www.sade.org    * Mental Health Amelia --  [phone]     Www.[facility].org    * National Suidide Hotline -- [phone]    [phone] [employer].  Todos los derechos reservados.  Esta informacion no pretende sustituir las atencion medica profesional.  Solo johnson medico puede " "diagnosticar y tratar un problema de nikki.         Patient Discharge Instructions for Postpartum Women    Resume Regular Diet  Increase activity gradually, no heavy lifting  Shower  No tampons, douching or sexual intercourse.  Discuss birth control options with your physician.  Wear a support bra  Return to work/school when you've been cleared by a physician    Call your physician if     Fever of 100.4 or higher  Persistent nausea/ vomiting  Incisional drainage  Heavy vaginal bleeding or large clots (Heavy bleeding is soaking 1 pad in an hour)  Swelling and pain in arms or legs  Severe headaches, blurred vision or fainting  Shortness of breath  Frequency and burning with urination  Signs of postpartum depression, discuss these signs with your physician    Call lactation services for questions regarding feeding, nipple and breast care, and general questions about lactation.  They can be reached at [phone]     Understanding Postpartum Depression    You've just had a baby.  You know you should be excited and happy.  But instead you find yourself crying for no reason.  You may have trouble coping with your daily tasks.  You feel sad, tired, and hopeless most of the time.  You may even feel ashamed or guilty.  But what you're going through is not your fault and you can feel better.  Talk to your doctor.  He or she can help.    Depression After Childbirth    You may be weepy and tired right after giving birth.  These feelings are normal.  They're sometimes called the "baby blues."  These blues go away 2-3 weeks.  However, postpartum (meaning "after birth") depression lasts much longer and is more sever than the "baby blues."  It can make you feel sad and hopeless.  You may also fear that your baby will be harmed and worry about being a bad mother.      What is Depression?    Depression is a mood disorder that affects the way you think and feel.  The most common symptom is a feeling of deep sadness.  You may also " feel as if you just can't cope with life.    Other symptoms include:    Gaining or loosing weight  Sleeping too much or too little  Feeling tired all the time  Feeling restless  Fears of harming your baby   Lack of interest in your baby  Feeling worthless or guilty  No longer finding pleasure in things you used to  Having trouble thinking clearly or making decisions  Thoughts of hurting yourself or your baby

## 2025-05-05 NOTE — H&P
HPI:   Vilma Rao is a 25 y.o. female  at 39.1 wks EGA who presents here for induction of labor.    ROS:  GENERAL: Denies weight gain or weight loss. Feeling well overall.   SKIN: Denies rash or lesions.   HEAD: Denies head injury or headache.   CHEST: Denies chest pain or shortness of breath.   CARDIOVASCULAR: Denies palpitations or left sided chest pain.   ABDOMEN: No abdominal pain, constipation, diarrhea, nausea, vomiting or rectal bleeding.   URINARY: No frequency, dysuria, hematuria, or burning on urination.  REPRODUCTIVE: See HPI.     No past medical history on file.  History reviewed. No pertinent surgical history.  No family history on file.  Allergies: Patient has no known allergies.       PE:   Temp:  [98.4 °F (36.9 °C)-99.1 °F (37.3 °C)]   Pulse:  []   Resp:  [16]   BP: (106-111)/(64-66)   SpO2:  [96 %-98 %]   APPEARANCE: Well nourished, well developed, in no acute distress.    ABDOMEN:  Gravid.   SVE: 3/25/  FHT: 150bpm, mod juan, +accels, no decels  Winters: q4 min pit @ 16    Lab Results   Component Value Date    WBC 11.90 2025    HGB 11.3 (L) 2025    HCT 34.6 (L) 2025    MCV 86 2025     2025       O NEG    Assessment:  IUP 39.1  weeks, IOL    Plan:   Admit to L&D  FHT Cat 1  GBS neg  Plan: continue pitocin until adequate ctx pattern  AROM clear  Rh negative  Consents reviewed    SMILEY Palmer MD

## 2025-05-05 NOTE — PLAN OF CARE
Dr Palmer asked to reviewed ctx pattern, -180, tachysystole noted. MD wants to keep Pitocin at rate of 40 milliunits since not adequate. Request pt to change positions to see if ctx pattern changes.

## 2025-05-05 NOTE — PROGRESS NOTES
Labor Note    S: patient comfortable    O  Temp:  [98.4 °F (36.9 °C)-99.1 °F (37.3 °C)]   Pulse:  []   Resp:  [16]   BP: (106-128)/(60-73)   SpO2:  [96 %-99 %]    Gen: A&ox3, NAD  Abd: gravid  SVE: 3/50/0  FHT: 150bpm, mod juan, +accels, no decels  Bajandas: q 2 min pit @ 28    AP: 26 yo  female @ 39.1 wks, IOL  FHT cat 1  GBS neg  IUPC placed  AROM clear currently afebrile    SMILEY tejeda MD

## 2025-05-05 NOTE — ANESTHESIA PREPROCEDURE EVALUATION
"[facility]  Anesthesia Pre-Operative Evaluation         Patient Name: Vilma Rao  YOB: 2000  MRN: [id number]    2025      Vilma Rao is a 25 y.o. female  @ 39w1d who presents for IOL.    OB History    Para Term  AB Living   4 2 2  1 2   SAB IAB Ectopic Multiple Live Births   1   0 2      # Outcome Date GA Lbr Ben/2nd Weight Sex Type Anes PTL Lv   4 Current            3 Term 23 39w5d  3.68 kg (8 lb 1.8 oz) M Vag-Spont Spinal, EPI  CONOR   2 SAB  13w0d          1 Term  40w0d   M Vag-Spont   CONOR       Review of patient's allergies indicates:  No Known Allergies    Wt Readings from Last 1 Encounters:   25 0500 94.3 kg (208 lb)       BP Readings from Last 3 Encounters:   25 121/67   25 108/73   25 100/68       Problem List[1]    History reviewed. No pertinent surgical history.    Social History[2]      Chemistry        Component Value Date/Time     (L) 2025 0602     2020    K 3.0 (L) 2025 0602    K 2.9 (L) 2020     2025 0602     2020    CO2 20 (L) 2025 0602    CO2 27 2020    BUN 8 2025 0602    CREATININE 0.5 2025 0602    GLU 81 2025 0602     2020        Component Value Date/Time    CALCIUM 9.2 2025 0602    CALCIUM 10.0 2020    ALKPHOS 137 2025 0602    ALKPHOS 64 2020    AST 15 2025 0602    AST 21 2020    ALT 11 2025 0602    ALT 18 2020    BILITOT 0.2 2025 0602    BILITOT 0.2 2020    ESTGFRAFRICA >60 2020    EGFRNONAA >60 2020            Lab Results   Component Value Date    WBC 11.90 2025    HGB 11.3 (L) 2025    HCT 34.6 (L) 2025    MCV 86 2025     2025       No results for input(s): "PT", "INR", "PROTIME", "APTT" in the last 72 " hours.            Pre-op Assessment    I have reviewed the Patient Summary Reports.     I have reviewed the Nursing Notes. I have reviewed the NPO Status.   I have reviewed the Medications.     Review of Systems  Hematology/Oncology:  Hematology Normal                                     Cardiovascular:  Cardiovascular Normal                                              Pulmonary:  Pulmonary Normal                       Renal/:  Renal/ Normal                 Hepatic/GI:  Hepatic/GI Normal                    Neurological:  Neurology Normal                                      Endocrine:  Endocrine Normal          Obesity / BMI > 30      Physical Exam  General: Well nourished, Cooperative, Alert and Oriented    Airway:  Mallampati: III / II  Mouth Opening: Normal  TM Distance: Normal  Tongue: Normal  Neck ROM: Normal ROM    Dental:  Intact    Chest/Lungs:  Normal Respiratory Rate    Heart:  Rate: Normal    Abdomen:  Gravid      Anesthesia Plan  Type of Anesthesia, risks & benefits discussed:    Anesthesia Type: Epidural  Intra-op Monitoring Plan: Standard ASA Monitors  Post Op Pain Control Plan: multimodal analgesia and IV/PO Opioids PRN  Induction:  IV and rapid sequence  Airway Plan: Video  Informed Consent: Informed consent signed with the Patient and all parties understand the risks and agree with anesthesia plan.  All questions answered.   ASA Score: 2  Day of Surgery Review of History & Physical: H&P Update referred to the surgeon/provider.    Ready For Surgery From Anesthesia Perspective.     .           [1]   Patient Active Problem List  Diagnosis   (none) - all problems resolved or deleted   [2]   Social History  Socioeconomic History    Marital status: Single   Tobacco Use    Smoking status: Never    Smokeless tobacco: Never   Substance and Sexual Activity    Alcohol use: Not Currently    Drug use: Never    Sexual activity: Yes     Partners: Male

## 2025-05-05 NOTE — ANESTHESIA PROCEDURE NOTES
Epidural    Patient location during procedure: OB   Reason for block: primary anesthetic   Reason for block: labor analgesia requested by patient and obstetrician  Diagnosis: iup   Start time: 5/5/2025 4:16 PM  Timeout: 5/5/2025 4:14 PM  End time: 5/5/2025 4:26 PM    Staffing  Performing Provider: Bhavna Guzmán CRNA  Authorizing Provider: Adin Ramos MD    Staffing  Performed by: Bhavna Guzmán CRNA  Authorized by: Adin Ramos MD        Preanesthetic Checklist  Completed: patient identified, IV checked, site marked, risks and benefits discussed, surgical consent, monitors and equipment checked, pre-op evaluation, timeout performed, anesthesia consent given, hand hygiene performed and patient being monitored  Preparation  Patient position: sitting  Prep: ChloraPrep  Patient monitoring: ECG, Pulse Ox and Blood Pressure  Reason for block: primary anesthetic   Epidural  Skin Anesthetic: lidocaine 1%  Skin Wheal: 3 mL  Administration type: continuous  Approach: midline  Interspace: L4-5    Injection technique: MOHINI saline  Needle and Epidural Catheter  Needle type: Tuohy   Needle gauge: 17  Needle length: 3.5 inches  Needle insertion depth: 5.5 cm  Catheter type: LV Sensors  Catheter size: 19 G  Catheter at skin depth: 10 cm  Insertion Attempts: 1  Test dose: 3 mL of lidocaine 1.5% with Epi 1-to-200,000  Additional Documentation: incremental injection, no paresthesia on injection, no significant pain on injection, no signs/symptoms of IV or SA injection and no significant complaints from patient  Needle localization: anatomical landmarks  Assessment  Ease of block: easy  Patient's tolerance of the procedure: comfortable throughout block  Additional Notes  Assisted by  Felicia Arias # [id number] No inadvertent dural puncture with Tuohy.  Dural puncture not performed with spinal needle

## 2025-05-06 DIAGNOSIS — R52 POSTPARTUM PAIN: Primary | ICD-10-CM

## 2025-05-06 LAB
ABSOLUTE EOSINOPHIL (OHS): 0.04 K/UL
ABSOLUTE MONOCYTE (OHS): 0.92 K/UL (ref 0.3–1)
ABSOLUTE NEUTROPHIL COUNT (OHS): 12.84 K/UL (ref 1.8–7.7)
BASOPHILS # BLD AUTO: 0.03 K/UL
BASOPHILS NFR BLD AUTO: 0.2 %
ERYTHROCYTE [DISTWIDTH] IN BLOOD BY AUTOMATED COUNT: 14.9 % (ref 11.5–14.5)
HCT VFR BLD AUTO: 32.7 % (ref 37–48.5)
HGB BLD-MCNC: 10.8 GM/DL (ref 12–16)
IMM GRANULOCYTES # BLD AUTO: 0.09 K/UL (ref 0–0.04)
IMM GRANULOCYTES NFR BLD AUTO: 0.6 % (ref 0–0.5)
INDIRECT COOMBS: NORMAL
LYMPHOCYTES # BLD AUTO: 2.05 K/UL (ref 1–4.8)
MCH RBC QN AUTO: 28.1 PG (ref 27–31)
MCHC RBC AUTO-ENTMCNC: 33 G/DL (ref 32–36)
MCV RBC AUTO: 85 FL (ref 82–98)
NUCLEATED RBC (/100WBC) (OHS): 0 /100 WBC
PLATELET # BLD AUTO: 228 K/UL (ref 150–450)
PMV BLD AUTO: 10.1 FL (ref 9.2–12.9)
RBC # BLD AUTO: 3.84 M/UL (ref 4–5.4)
RELATIVE EOSINOPHIL (OHS): 0.3 %
RELATIVE LYMPHOCYTE (OHS): 12.8 % (ref 18–48)
RELATIVE MONOCYTE (OHS): 5.8 % (ref 4–15)
RELATIVE NEUTROPHIL (OHS): 80.3 % (ref 38–73)
RH BLD: NORMAL
RH IMMUNE GLOBULIN: NORMAL
ROSETTE - FMH (FETAL BLEED SCREEN): NORMAL
WBC # BLD AUTO: 15.97 K/UL (ref 3.9–12.7)

## 2025-05-06 PROCEDURE — 85461 HEMOGLOBIN FETAL: CPT | Performed by: OBSTETRICS & GYNECOLOGY

## 2025-05-06 PROCEDURE — 11000001 HC ACUTE MED/SURG PRIVATE ROOM

## 2025-05-06 PROCEDURE — 63600175 PHARM REV CODE 636 W HCPCS: Mod: JZ,TB | Performed by: OBSTETRICS & GYNECOLOGY

## 2025-05-06 PROCEDURE — 86900 BLOOD TYPING SEROLOGIC ABO: CPT | Performed by: OBSTETRICS & GYNECOLOGY

## 2025-05-06 PROCEDURE — 25000003 PHARM REV CODE 250: Performed by: OBSTETRICS & GYNECOLOGY

## 2025-05-06 PROCEDURE — 85025 COMPLETE CBC W/AUTO DIFF WBC: CPT | Performed by: OBSTETRICS & GYNECOLOGY

## 2025-05-06 PROCEDURE — 3E0234Z INTRODUCTION OF SERUM, TOXOID AND VACCINE INTO MUSCLE, PERCUTANEOUS APPROACH: ICD-10-PCS | Performed by: OBSTETRICS & GYNECOLOGY

## 2025-05-06 PROCEDURE — 36415 COLL VENOUS BLD VENIPUNCTURE: CPT | Performed by: OBSTETRICS & GYNECOLOGY

## 2025-05-06 RX ORDER — IBUPROFEN 800 MG/1
800 TABLET ORAL EVERY 8 HOURS PRN
Qty: 30 TABLET | Refills: 0 | Status: SHIPPED | OUTPATIENT
Start: 2025-05-06 | End: 2026-05-06

## 2025-05-06 RX ADMIN — IBUPROFEN 600 MG: 600 TABLET, FILM COATED ORAL at 12:05

## 2025-05-06 RX ADMIN — HUMAN RHO(D) IMMUNE GLOBULIN 1500 UNITS: 1500 INJECTION, SOLUTION INTRAMUSCULAR; INTRAVENOUS at 05:05

## 2025-05-06 NOTE — LACTATION NOTE
[facility] - Mother & Baby  Lactation Note - Mom    SUMMARY     Maternal Assessment    Breast Shape: Bilateral:, pendulous  Breast Density: Bilateral:, soft  Areola: Bilateral:, elastic  Nipples: Bilateral:, everted  Left Nipple Symptoms:  (denies pain)  Right Nipple Symptoms:  (denies pain)      LATCH Score         Breasts WDL    Breast WDL: WDL  Left Nipple Symptoms:  (denies pain)  Right Nipple Symptoms:  (denies pain)    Maternal Infant Feeding    Maternal Preparation: breast care, hand hygiene  Maternal Emotional State: independent, relaxed  Infant Positioning: cradle  Signs of Milk Transfer: audible swallow, infant jaw motion present  Pain with Feeding: no  Comfort Measures Before/During Feeding: latch adjusted, infant position adjusted  Comfort Measures Following Feeding: air-drying encouraged  Nipple Shape After Feeding, Left: round, everted  Latch Assistance:  (enc to call for latch assist prn)    Lactation Referrals    Community Referrals: outpatient lactation program, support group  Outpatient Lactation Program Lactation Follow-up Date/Time: Call Lact. Ctr prn    Lactation Interventions    Breast Care: Breastfeeding: open to air  Breastfeeding Assistance: feeding on demand promoted, feeding cue recognition promoted, support offered  Breast Care: Breastfeeding: open to air  Breastfeeding Assistance: feeding on demand promoted, feeding cue recognition promoted, support offered  Fetal Wellbeing Promotion: intake and output monitored  Breastfeeding Support: diary/feeding log utilized, encouragement provided, infant-mother separation minimized, lactation counseling provided       Breastfeeding Session    Infant Positioning: cradle  Signs of Milk Transfer: audible swallow, infant jaw motion present    Maternal Information

## 2025-05-06 NOTE — PLAN OF CARE
VSS, NAD. Pt ambulating and voiding without difficulty. Tolerating regular diet. Reports adequate pain management. Positive bonding noted. Questions encouraged and answered.

## 2025-05-06 NOTE — LACTATION NOTE
Rounded on couplet.  Infant nursing on the left side, started @ 0350.  Infant observed to be sucking heartily.  Reviewed BF basics w/pt, ways to wake/stimulate a sleepy baby.  Explained hand expression and pt was able to hand express multiple large drops from her R side.  Pt does plan to breast and formula feed.  Reinforced the important of waking infant for her feeds, putting infant to breast first for supply and demand and then formula if needed. Infant finished nursing while this nurse was present.  Infant nursed for 15 mins on the left side.  Instructed pt to call for breast feeding assistance or questions.  Pt verbalized understanding.      [facility]  Lactation Note - Mom    SUMMARY     Maternal Assessment    Breast Shape: Bilateral:, pendulous  Breast Density: Bilateral:, soft  Areola: Bilateral:, elastic  Nipples: Bilateral:, everted  Left Nipple Symptoms: other (see comments) (denies pain)  Right Nipple Symptoms: other (see comments) (denies pain)      LATCH Score         Breasts WDL    Breast WDL: WDL  Left Nipple Symptoms: other (see comments) (denies pain)  Right Nipple Symptoms: other (see comments) (denies pain)    Maternal Infant Feeding    Maternal Preparation: breast care  Maternal Emotional State: independent, relaxed  Infant Positioning: cradle  Signs of Milk Transfer: audible swallow, infant jaw motion present  Pain with Feeding: no  Comfort Measures Before/During Feeding: latch adjusted, infant position adjusted  Comfort Measures Following Feeding: air-drying encouraged  Nipple Shape After Feeding, Left: round, everted  Latch Assistance: yes (pt's nurse assisted)    Lactation Referrals    Community Referrals: outpatient lactation program, support group  Outpatient Lactation Program Lactation Follow-up Date/Time: Call Lact. Ctr prn    Lactation Interventions    Breast Care: Breastfeeding: open to air, milk massaged towards nipple  Breastfeeding Assistance: feeding cue recognition  promoted, feeding on demand promoted, feeding session observed, hand expression verified, infant latch-on verified, support offered  Breast Care: Breastfeeding: open to air, milk massaged towards nipple  Breastfeeding Assistance: feeding cue recognition promoted, feeding on demand promoted, feeding session observed, hand expression verified, infant latch-on verified, support offered  Fetal Wellbeing Promotion: intake and output monitored  Breastfeeding Support: encouragement provided, infant-mother separation minimized, lactation counseling provided, diary/feeding log utilized       Breastfeeding Session    Infant Positioning: cradle  Signs of Milk Transfer: audible swallow, infant jaw motion present    Maternal Information

## 2025-05-06 NOTE — L&D DELIVERY NOTE
Operative Note    Date: 2025  Time: 11:04 PM  Preoperative Diagnosis: IUP @ 39.1 wks, IOL  Postoperative Diagnosis: same  Procedure: s/p   Type of Anesthesia: epidural  Surgeon: eleanor palmer MD  Specimen/Tissue Removed: intact 3 vessel cord placenta  Estimated Blood Loss: 250cc  Complications: none  Findings: viable female infant in cephalic presentation with APGARS 9 and 9; Head delivered CHRISTINE; one loose nuchal cord easily reduced; cord doubly clamped then cut by dr palmer. Moody placed on mother's abdomen. Intact 3 vessel cord placenta delivered with gentle manual traction. Uterine fundus massaged and noted to be firm.  Perineum evaluated and no teas appreciated. Hemostasis achieved. Mother and  baby healthy, doing well.          SAMANTHA Palmer MD

## 2025-05-06 NOTE — PROGRESS NOTES
PPD #1    S: patient doing well this am.    O  Temp:  [97.6 °F (36.4 °C)-99.4 °F (37.4 °C)]   Pulse:  []   Resp:  [16-18]   BP: ()/(52-84)   SpO2:  [63 %-100 %]    Gen: A&ox3, NAD  Abd: soft, fundus firm and nontender at level of umbilicus  Ext: n o LE edema    Lab Results   Component Value Date    WBC 15.97 (H) 2025    HGB 10.8 (L) 2025    HCT 32.7 (L) 2025    MCV 85 2025     2025       O NEG    AP: 26 yo now  female s/p , doing well.  Continue routine pp care  Rh neg: to get rhogam today  Nexplanon for contraception - will try to insert prior to d/c to home    Anticipate d/c to home in the am    SMILEY tejeda MD

## 2025-05-07 ENCOUNTER — PATIENT MESSAGE (OUTPATIENT)
Dept: OBSTETRICS AND GYNECOLOGY | Facility: HOSPITAL | Age: 25
End: 2025-05-07
Payer: MEDICAID

## 2025-05-07 VITALS
WEIGHT: 208 LBS | OXYGEN SATURATION: 97 % | HEART RATE: 69 BPM | DIASTOLIC BLOOD PRESSURE: 57 MMHG | BODY MASS INDEX: 35.51 KG/M2 | RESPIRATION RATE: 18 BRPM | HEIGHT: 64 IN | SYSTOLIC BLOOD PRESSURE: 93 MMHG | TEMPERATURE: 98 F

## 2025-05-07 PROBLEM — Z30.017 ENCOUNTER FOR INITIAL PRESCRIPTION OF IMPLANTABLE SUBDERMAL CONTRACEPTIVE: Status: ACTIVE | Noted: 2025-05-07

## 2025-05-07 PROCEDURE — 0JHF3HZ INSERTION OF CONTRACEPTIVE DEVICE INTO LEFT UPPER ARM SUBCUTANEOUS TISSUE AND FASCIA, PERCUTANEOUS APPROACH: ICD-10-PCS | Performed by: OBSTETRICS & GYNECOLOGY

## 2025-05-07 PROCEDURE — 63600175 PHARM REV CODE 636 W HCPCS: Performed by: OBSTETRICS & GYNECOLOGY

## 2025-05-07 PROCEDURE — 25000003 PHARM REV CODE 250: Performed by: OBSTETRICS & GYNECOLOGY

## 2025-05-07 RX ORDER — LIDOCAINE HYDROCHLORIDE 10 MG/ML
1 INJECTION, SOLUTION EPIDURAL; INFILTRATION; INTRACAUDAL; PERINEURAL ONCE
Status: COMPLETED | OUTPATIENT
Start: 2025-05-07 | End: 2025-05-07

## 2025-05-07 RX ADMIN — LIDOCAINE HYDROCHLORIDE 10 MG: 10 INJECTION, SOLUTION EPIDURAL; INFILTRATION; INTRACAUDAL; PERINEURAL at 09:05

## 2025-05-07 RX ADMIN — ETONOGESTREL 68 MG: 68 IMPLANT SUBCUTANEOUS at 09:05

## 2025-05-07 RX ADMIN — IBUPROFEN 600 MG: 600 TABLET, FILM COATED ORAL at 12:05

## 2025-05-07 NOTE — LACTATION NOTE
[facility]  Lactation Note - Mom    SUMMARY     Maternal Assessment    Breast Shape: Bilateral:, pendulous  Breast Density: Bilateral:, soft  Areola: Bilateral:, elastic  Nipples: Bilateral:, everted  Left Nipple Symptoms:  (denies pain)  Right Nipple Symptoms:  (denies pain)      LATCH Score         Breasts WDL    Breast WDL: WDL  Left Nipple Symptoms:  (denies pain)  Right Nipple Symptoms:  (denies pain)    Maternal Infant Feeding    Maternal Preparation: breast care, hand hygiene  Maternal Emotional State: independent, relaxed  Infant Positioning: cradle  Signs of Milk Transfer: audible swallow, infant jaw motion present  Pain with Feeding: no  Comfort Measures Before/During Feeding: latch adjusted, infant position adjusted  Comfort Measures Following Feeding: air-drying encouraged  Nipple Shape After Feeding, Left: round, everted  Latch Assistance: no    Lactation Referrals    Community Referrals: pediatric care provider, support group  Outpatient Lactation Program Lactation Follow-up Date/Time: Call Lact. Ctr prn  Pediatric Care Provider Lactation Follow-up Date/Time: follow up with peds within 1-2 days for wt check  Support Group Lactation Follow-up Date/Time: community resources given in handout    Lactation Interventions    Breast Care: Breastfeeding: open to air  Breastfeeding Assistance: feeding cue recognition promoted, feeding on demand promoted, support offered  Breast Care: Breastfeeding: open to air  Breastfeeding Assistance: feeding cue recognition promoted, feeding on demand promoted, support offered  Fetal Wellbeing Promotion: intake and output monitored  Breastfeeding Support: diary/feeding log utilized, encouragement provided, infant-mother separation minimized, lactation counseling provided       Breastfeeding Session    Infant Positioning: cradle  Signs of Milk Transfer: audible swallow, infant jaw motion present    Maternal Information

## 2025-05-07 NOTE — ANESTHESIA POSTPROCEDURE EVALUATION
Anesthesia Post Evaluation    Patient: Vilma Rao    Procedure(s) Performed: * No procedures listed *    Final Anesthesia Type: epidural      Patient location during evaluation: labor & delivery  Patient participation: Yes- Able to Participate  Level of consciousness: awake  Post-procedure vital signs: reviewed and stable  Pain management: adequate  Airway patency: patent    PONV status at discharge: No PONV  Anesthetic complications: no      Cardiovascular status: blood pressure returned to baseline  Respiratory status: unassisted  Hydration status: euvolemic  Follow-up not needed.              Vitals Value Taken Time   BP 93/57 05/07/25 07:55   Temp 36.8 °C (98.2 °F) 05/07/25 07:55   Pulse 69 05/07/25 07:55   Resp 18 05/07/25 07:55   SpO2 97 % 05/07/25 07:55         No case tracking events are documented in the log.      Pain/Rosamaria Score: Pain Rating Prior to Med Admin: 3 (5/7/2025 12:04 AM)  Pain Rating Post Med Admin: 0 (5/7/2025  2:00 AM)

## 2025-05-07 NOTE — PROCEDURES
NEXPLANON INSERTION:    Date: 2025  Time: 9:56 AM  Name of the procedure: Nexplanon Insertion  Indications: Vilma Rao is a 25 y.o. female  who presents today for insertion of Nexplanon.  Patient's last menstrual period was 2024 (approximate)..      PRE-Nexplanon INSERTION COUNSELING:  All contraceptive options were reviewed and the patient chooses Nexplanon.  Patients history was reviewed and there were no contraindications to Nexplanon.  The procedure and minimal risks of pain, bleeding, bruising and infection at the insertion site discussed. Possible irregular menstrual bleeding pattern versus amenorrhea was explained. No protection against STDs discussed.    Consents: Risks/benefits of the procedure were discussed with the patient. Patient's questions were answered.  Consents signed.      Labs:   Office urine pregnancy test negative;    Procedure:   TIME OUT PERFORMED.  Area for insertion on patient's nondominant arm LEFT arm was swabbed with alcohol x 2.  2 cc of 1% lidocaine was injected for local anesthesia. The Nexplanon applicator was then inserted and advanced subcutaneously in upper LEF arm about 8cm above the medial epicondyle using standard technique. Placement was then confirmed by palpating the patient's arm. Small adhesive bandage was then placed over the insertion site.     Complications: none    EBL: min    Disposition: Pt tolerated the procedure well.    SMILEY tejeda MD

## 2025-05-07 NOTE — PLAN OF CARE
AAOx4, respirations clear and unlabored, bowel sounds present, and +2 pulses bilaterally. Tolerating regular diet, ambulating freely without dizziness, and voiding spontaneously. POC reviewed with pt and understanding expressed. Questions encouraged and answered. Positive bonding noted between pt and infant; BF spontaneously.   Vss and nad noted. Safety precautions maintained; bed in low position, wheels locked, side rails up x2, and call light within reach. Pt stable and ready for discharge; d/c teaching given to pt and SO. Pt left via wheelchair with infant in arms.

## 2025-05-07 NOTE — DISCHARGE SUMMARY
[facility]  Obstetrics  Discharge Summary      Patient Name: iVlma Rao  MRN: [id number]  Admission Date: 2025  Hospital Length of Stay: 2 days  Discharge Date and Time: 2025 9:57 AM  Attending Physician: Gamaliel Palmer MD   Discharging Provider: Gamaliel Palmer MD  Primary Care Provider: [place], Primary Doctor    HPI: 24 yo now  female admitted at 39+ weeks for induction of labor.    Hospital Course: The patient's labor course was uncomplicated. She is now s/p uncomplicated normal spontaneous vaginal delivery. Her postpartum course was also uncomplicated. On day of discharge, she was tolerating PO. Her pain was well controlled. She was ambulating and voiding spontaneously. She is s/p nexplanon insertion for pp contraception on 2025.  She received Rhogam postpartum prior to discharge to home.      Final Active Diagnoses:    Diagnosis Date Noted POA    PRINCIPAL PROBLEM:   (normal spontaneous vaginal delivery) [O80] 2023 Not Applicable    Encounter for initial prescription of implantable subdermal contraceptive [Z30.017] 2025 No      Problems Resolved During this Admission:      Lab Results   Component Value Date    WBC 15.97 (H) 2025    HGB 10.8 (L) 2025    HCT 32.7 (L) 2025    MCV 85 2025     2025       O NEG      Feeding Method: both breast and bottle    Immunizations       Date Immunization Status Dose Route/Site Given by    25 0006 MMR Incomplete 0.5 mL Subcutaneous/     25 0006 Tdap Incomplete 0.5 mL Intramuscular/     25 1739 Rho (D) Immune Globulin - IM Given 1,500 Units Intramuscular/Left Ventrogluteal Paolo Spangler RN            Delivery:    Episiotomy: None   Lacerations: None   Repair suture: None   Repair # of packets:     Blood loss (ml):       Birth information:  YOB: 2026   Time of birth: 10:58 PM   Sex: female   Delivery type: Vaginal, Spontaneous   Gestational Age:  "39w1d     Measurements    Weight: 3510 g  Weight (lbs): 7 lb 11.8 oz  Length: 48 cm  Length (in): 18.9"  Head circumference: 35 cm  Chest circumference: 33 cm  Abdominal girth: 32.5 cm         Delivery Clinician: Delivery Providers    Delivering clinician: Gamaliel Palmer MD   Provider Role    Sridevi Hancock, Yuly Berrios, Angelic Teague, Adilson Jung, Debra Newby, RRT     Teagan Cervantes RN Joerger, Amanda, LV              Additional  information:  Forceps:    Vacuum:    Breech:    Observed anomalies      Living?:     Apgars    Living status: Living  Apgar Component Scores:  1 min.:  5 min.:  10 min.:  15 min.:  20 min.:    Skin color:  1  1       Heart rate:  2  2       Reflex irritability:  2  2       Muscle tone:  2  2       Respiratory effort:  2  2       Total:  9  9       Apgars assigned by: CECY MANZO RN         Placenta: Delivered:       appearance  Pending Diagnostic Studies:       None            Discharged Condition: good    Disposition: Home or Self Care    Follow Up:   Follow-up Information       Gamaliel Palmer MD Follow up on 2025.    Specialties: Obstetrics, Obstetrics and Gynecology  Why: 1030am for pp visit/nexplanon check  Contact information:  [address]  [address] [place] [id number] [phone]               Gamaliel Palmer, Patient Care Assistant .                           Patient Instructions:   No discharge procedures on file.  Medications:  Current Discharge Medication List        CONTINUE these medications which have NOT CHANGED    Details   docusate sodium (COLACE) 100 MG capsule Take 2 capsules (200 mg total) by mouth 2 (two) times daily as needed for Constipation.  Qty: 60 capsule, Refills: 1      !! ibuprofen (ADVIL,MOTRIN) 600 MG tablet Take 1 tablet (600 mg total) by mouth every 6 (six) hours as needed (cramping).  Qty: 60 tablet, Refills: 1      !! ibuprofen (ADVIL,MOTRIN) 800 MG tablet Take 1 tablet (800 mg " total) by mouth every 8 (eight) hours as needed for Pain.  Qty: 30 tablet, Refills: 0    Associated Diagnoses: Postpartum pain       !! - Potential duplicate medications found. Please discuss with provider.          Gamaliel Palmer MD  Obstetrics  [facility]

## 2025-05-30 ENCOUNTER — POSTPARTUM VISIT (OUTPATIENT)
Dept: OBSTETRICS AND GYNECOLOGY | Facility: CLINIC | Age: 25
End: 2025-05-30
Payer: MEDICAID

## 2025-05-30 VITALS
BODY MASS INDEX: 32.92 KG/M2 | WEIGHT: 191.81 LBS | SYSTOLIC BLOOD PRESSURE: 110 MMHG | DIASTOLIC BLOOD PRESSURE: 76 MMHG

## 2025-05-30 PROCEDURE — 99999 PR PBB SHADOW E&M-EST. PATIENT-LVL III: CPT | Mod: PBBFAC,,, | Performed by: OBSTETRICS & GYNECOLOGY

## 2025-05-30 PROCEDURE — 99213 OFFICE O/P EST LOW 20 MIN: CPT | Mod: PBBFAC,PO | Performed by: OBSTETRICS & GYNECOLOGY

## 2025-05-30 NOTE — PROGRESS NOTES
The patient presents for her postpartum visit. She has no complaints today. Denies fever, chills. She denies pain with urination.  Denies diarrhea. Denies constipation.   S/p nexplanon insertion on 2025.      Date of delivery: 2025  Preoperative Diagnosis: IUP @ 39.1 wks, IOL  Postoperative Diagnosis: same  Procedure: s/p   Type of Anesthesia: epidural  Surgeon: eleanor palmer MD  Specimen/Tissue Removed: intact 3 vessel cord placenta  Estimated Blood Loss: 250cc  Complications: none  Findings: viable female    Delivery MD:  eleanor palmer  baby Name:  ebony  Breast Feeding:  only  Vaginal Bleeding:  min  Incontinence: no  Depression:  no  Carman yet: no  Contraception discussed and patient s/p nexplanon insertion      ROS: negative    PE:   Vitals: /76   Wt 87 kg (191 lb 12.8 oz)   LMP 2024 (Approximate)   BMI 32.92 kg/m²   APPEARANCE: Well nourished, well developed, in no acute distress.  ABDOMEN: Soft. No tenderness or masses. No hepatosplenomegaly. No hernias.   PELVIC: Normal external female genitalia without lesions. Normal hair distribution. Adequate perineal body, normal urethral meatus. Vagina moist and well rugated without lesions or discharge. Cervix pink and without lesions. No significant cystocele or rectocele. Bimanual exam deferred      A/P: Postpartum visit  -patient doing well,   -contraception: nexplanon in place since 2025  -next Pap due: 2025    SMILEY Palmer MD    Operative Note     Date: 2025  Time: 11:04 PM  Preoperative Diagnosis: IUP @ 39.1 wks, IOL  Postoperative Diagnosis: same  Procedure: s/p   Type of Anesthesia: epidural  Surgeon: eleanor palmer MD  Specimen/Tissue Removed: intact 3 vessel cord placenta  Estimated Blood Loss: 250cc  Complications: none  Findings: viable female